# Patient Record
Sex: FEMALE | Race: BLACK OR AFRICAN AMERICAN | URBAN - METROPOLITAN AREA
[De-identification: names, ages, dates, MRNs, and addresses within clinical notes are randomized per-mention and may not be internally consistent; named-entity substitution may affect disease eponyms.]

---

## 2019-09-19 ENCOUNTER — OFFICE VISIT (OUTPATIENT)
Dept: FAMILY MEDICINE CLINIC | Age: 64
End: 2019-09-19

## 2019-09-19 VITALS
WEIGHT: 171 LBS | HEIGHT: 63 IN | RESPIRATION RATE: 13 BRPM | OXYGEN SATURATION: 99 % | DIASTOLIC BLOOD PRESSURE: 82 MMHG | TEMPERATURE: 97.8 F | HEART RATE: 62 BPM | SYSTOLIC BLOOD PRESSURE: 126 MMHG | BODY MASS INDEX: 30.3 KG/M2

## 2019-09-19 DIAGNOSIS — B35.3 TINEA PEDIS OF BOTH FEET: Primary | ICD-10-CM

## 2019-09-19 DIAGNOSIS — Z13.6 ENCOUNTER FOR LIPID SCREENING FOR CARDIOVASCULAR DISEASE: ICD-10-CM

## 2019-09-19 DIAGNOSIS — E66.3 OVERWEIGHT (BMI 25.0-29.9): ICD-10-CM

## 2019-09-19 DIAGNOSIS — Z13.220 ENCOUNTER FOR LIPID SCREENING FOR CARDIOVASCULAR DISEASE: ICD-10-CM

## 2019-09-19 DIAGNOSIS — M54.32 SCIATICA, LEFT SIDE: ICD-10-CM

## 2019-09-19 RX ORDER — KETOCONAZOLE 20 MG/G
CREAM TOPICAL
Qty: 30 G | Refills: 2 | Status: SHIPPED | OUTPATIENT
Start: 2019-09-19 | End: 2020-09-30 | Stop reason: ALTCHOICE

## 2019-09-19 NOTE — PROGRESS NOTES
Cora Cheatham is a 75 y.o. female (: 1955) presenting to address:    Chief Complaint   Patient presents with   [facility].     declined flu vaccine    Nail Problem     left thumb nail is coming off, removed from skin, very itchy; bilat foot fungus in simental of feet between toes       Vitals:    19 1519   Weight: 171 lb (77.6 kg)   Height: 5' 3.43\" (1.611 m)   PainSc:   0 - No pain       Hearing/Vision:   No exam data present    Learning Assessment:     Learning Assessment 2019   PRIMARY LEARNER Patient   HIGHEST LEVEL OF EDUCATION - PRIMARY LEARNER  4 YEARS OF COLLEGE   BARRIERS PRIMARY LEARNER NONE   CO-LEARNER CAREGIVER No   PRIMARY LANGUAGE ENGLISH    NEED No   LEARNER PREFERENCE PRIMARY DEMONSTRATION   ANSWERED BY patient   RELATIONSHIP SELF     Depression Screening:     3 most recent PHQ Screens 2019   Little interest or pleasure in doing things Not at all   Feeling down, depressed, irritable, or hopeless Not at all   Total Score PHQ 2 0     Fall Risk Assessment:     Fall Risk Assessment, last 12 mths 2019   Able to walk? Yes   Fall in past 12 months? No     Abuse Screening:     Abuse Screening Questionnaire 2019   Do you ever feel afraid of your partner? N   Are you in a relationship with someone who physically or mentally threatens you? N   Is it safe for you to go home? Y     Coordination of Care Questionaire:   1. Have you been to the ER, urgent care clinic since your last visit? Hospitalized since your last visit? NO    2. Have you seen or consulted any other health care providers outside of the [address] [place] [address] since your last visit? Include any pap smears or colon screening. YES, neurology    Advanced Directive:   1. Do you have an Advanced Directive? NO    2. Would you like information on Advanced Directives? YES    Patient DECLINED flu vaccine.

## 2019-09-19 NOTE — PATIENT INSTRUCTIONS
Return for fasting lab, further disposition pending lab results if indicated Sign release for past medical records Ketoconazole cream to be applied to the affected areas between the toes twice daily until symptoms resolve Avoid dietary starch and sugar and follow a program of regular aerobic exercise Follow exercise instructions for sciatica Schedule Medicare wellness evaluation follow-up in 6 months Learning About Low-Carbohydrate Diets for Weight Loss What is a low-carbohydrate diet? Low-carb diets avoid foods that are high in carbohydrate. These high-carb foods include pasta, bread, rice, cereal, fruits, and starchy vegetables. Instead, these diets usually have you eat foods that are high in fat and protein. Many people lose weight quickly on a low-carb diet. But the early weight loss is water. People on this diet often gain the weight back after they start eating carbs again. Not all diet plans are safe or work well. A lot of the evidence shows that low-carb diets aren't healthy. That's because these diets often don't include healthy foods like fruits and vegetables. Losing weight safely means balancing protein, fat, and carbs with every meal and snack. And low-carb diets don't always provide the vitamins, minerals, and fiber you need. If you have a serious medical condition, talk to your doctor before you try any diet. These conditions include kidney disease, heart disease, type 2 diabetes, high cholesterol, and high blood pressure. If you are pregnant, it may not be safe for your baby if you are on a low-carb diet. How can you lose weight safely? You might have heard that a diet plan helped another person lose weight. But that doesn't mean that it will work for you. It is very hard to stay on a diet that includes lots of big changes in your eating habits. If you want to get to a healthy weight and stay there, making healthy lifestyle changes will often work better than dieting. These steps can help. · Make a plan for change. Work with your doctor to create a plan that is right for you. · See a dietitian. He or she can show you how to make healthy changes in your eating habits. · Manage stress. If you have a lot of stress in your life, it can be hard to focus on making healthy changes to your daily habits. · Track your food and activity. You are likely to do better at losing weight if you keep track of what you eat and what you do. Follow-up care is a key part of your treatment and safety. Be sure to make and go to all appointments, and call your doctor if you are having problems. It's also a good idea to know your test results and keep a list of the medicines you take. Where can you learn more? Go to http://margarita-frank.info/. Enter A121 in the search box to learn more about \"Learning About Low-Carbohydrate Diets for Weight Loss. \" Current as of: November 7, 2018 Content Version: 12.1 © 1763-9284 [employer]. Care instructions adapted under license by [employer] (which disclaims liability or warranty for this information). If you have questions about a medical condition or this instruction, always ask your healthcare professional. [address] any warranty or liability for your use of this information. Athlete's Foot: Care Instructions Your Care Instructions Athlete's foot is an itchy rash on the foot caused by an infection with a fungus. You can get it by going barefoot in wet public areas, such as swimming pools or locker rooms. Many times there is no clear reason why you get athlete's foot. You can easily treat athlete's foot by putting medicine on your feet for 1 to 6 weeks. In some cases, a doctor may prescribe pills to kill the fungus. Follow-up care is a key part of your treatment and safety.  Be sure to make and go to all appointments, and call your doctor if you are having problems. It's also a good idea to know your test results and keep a list of the medicines you take. How can you care for yourself at home? · Your doctor may suggest an over-the counter lotion or spray or may prescribe a medicine. Take your medicines exactly as prescribed. Call your doctor if you think you are having a problem with your medicine. · Keep your feet clean and dry. · When you get dressed, put your socks on before your underwear. This can prevent the fungus from spreading from your feet to your groin. To prevent athlete's foot · Wear flip-flops or other shower sandals in public locker rooms and showers and by the pool. · Dry between your toes after swimming or bathing. · Wear leather shoes or sandals, which let air get to your feet. · Change your socks as needed so your feet stay as dry as possible. · Use antifungal powder on your feet. When should you call for help? Watch closely for changes in your health, and be sure to contact your doctor if: 
  · You do not get better as expected. Where can you learn more? Go to http://margarita-frank.info/. Enter M498 in the search box to learn more about \"Athlete's Foot: Care Instructions. \" Current as of: April 1, 2019 Content Version: 12.1 © 7780-4296 [employer]. Care instructions adapted under license by [employer] (which disclaims liability or warranty for this information). If you have questions about a medical condition or this instruction, always ask your healthcare professional. [address] any warranty or liability for your use of this information. Sciatica: Exercises Introduction Here are some examples of typical rehabilitation exercises for your condition. Start each exercise slowly. Ease off the exercise if you start to have pain. Your doctor or physical therapist will tell you when you can start these exercises and which ones will work best for you. When you are not being active, find a comfortable position for rest. Some people are comfortable on the floor or a medium-firm bed with a small pillow under their head and another under their knees. Some people prefer to lie on their side with a pillow between their knees. Don't stay in one position for too long. Take short walks (10 to 20 minutes) every 2 to 3 hours. Avoid slopes, hills, and stairs until you feel better. Walk only distances you can manage without pain, especially leg pain. How to do the exercises Back stretches 1. Get down on your hands and knees on the floor. 2. Relax your head and allow it to droop. Round your back up toward the ceiling until you feel a nice stretch in your upper, middle, and lower back. Hold this stretch for as long as it feels comfortable, or about 15 to 30 seconds. 3. Return to the starting position with a flat back while you are on your hands and knees. 4. Let your back sway by pressing your stomach toward the floor. Lift your buttocks toward the ceiling. 5. Hold this position for 15 to 30 seconds. 6. Repeat 2 to 4 times. Follow-up care is a key part of your treatment and safety. Be sure to make and go to all appointments, and call your doctor if you are having problems. It's also a good idea to know your test results and keep a list of the medicines you take. Where can you learn more? Go to http://margarita-frank.info/. Enter N135 in the search box to learn more about \"Sciatica: Exercises. \" Current as of: September 20, 2018 Content Version: 12.1 © 4578-2648 [employer]. Care instructions adapted under license by [employer] (which disclaims liability or warranty for this information). If you have questions about a medical condition or this instruction, always ask your healthcare professional. [address] any warranty or liability for your use of this information.

## 2019-09-19 NOTE — PROGRESS NOTES
HISTORY OF PRESENT ILLNESS  Kyleigh Nash is a 75 y.o. female. Presents to establish care, for health assessment, physical exam and preventative health care evaluation. Establish Care   The history is provided by the patient and medical records. Pertinent negatives include no chest pain, no abdominal pain, no headaches and no shortness of breath. Mr#: <[id number]>      Past Medical History:   Diagnosis Date    Arthritis     Chronic pain        Past Surgical History:   Procedure Laterality Date    HX GYN      3 c-sections       Family History   Problem Relation Age of Onset    Glaucoma Mother     Heart defect Mother        No Known Allergies    Social History     Tobacco Use   Smoking Status Former Smoker   Smokeless Tobacco Never Used       Social History     Substance and Sexual Activity   Alcohol Use Not Currently       Health Maintenance Review:  Colonoscopy - unknown  Mammogram - 18 months  Pap Smear - NL, N/A  DEXA scan -? Will schedule  Glaucoma check - < 2 years ago    Immunizations:?  Tetanus -  Influenza - Declnes  PCV - 13 -  PPSV - 23 -  HZV -    Medicare wellness evaluation -patient does not recall any previous Medicare wellness evaluation       There is no problem list on file for this patient. No current outpatient medications on file. Review of Systems   Constitutional: Negative for chills, fever and weight loss. HENT: Negative for congestion, hearing loss and sore throat. Eyes: Negative for blurred vision and double vision. Corrective lenses   Respiratory: Negative for cough, shortness of breath and wheezing. Cardiovascular: Negative for chest pain, palpitations and leg swelling. Gastrointestinal: Negative for abdominal pain, blood in stool, constipation, diarrhea, heartburn, melena, nausea and vomiting. Genitourinary: Negative for dysuria, flank pain, frequency, hematuria and urgency.         Post void dribbling   Musculoskeletal: Positive for back pain (Sciatica left ~ 3 years worse in the morning). Negative for joint pain and myalgias. Skin: Negative for itching and rash. Left thumbnail itches, \"ready to come off\"     \"Athlete's foot\", both feet, persistent -patient reports that ketoconazole cream was previously ineffective and caused hyperpigmentation of her toes. She has been applying OTC Tinactin cream without improvement       Neurological: Negative for dizziness, tingling, sensory change, focal weakness and headaches. Endo/Heme/Allergies: Negative for environmental allergies. Psychiatric/Behavioral: Negative for depression. The patient is not nervous/anxious and does not have insomnia. Visit Vitals  /82 (BP 1 Location: Left arm, BP Patient Position: Sitting)   Pulse 62   Temp 97.8 °F (36.6 °C) (Oral)   Resp 13   Ht 5' 3.43\" (1.611 m)   Wt 171 lb (77.6 kg)   SpO2 99%   BMI 29.89 kg/m²       Physical Exam   Constitutional: She is oriented to person, place, and time. She appears well-developed and well-nourished. HENT:   Head: Normocephalic. Right Ear: Tympanic membrane and ear canal normal.   Left Ear: Tympanic membrane and ear canal normal.   Mouth/Throat: Oropharynx is clear and moist.   Eyes: Pupils are equal, round, and reactive to light. Conjunctivae and EOM are normal.   Neck: Neck supple. Cardiovascular: Normal rate, regular rhythm, normal heart sounds and intact distal pulses. Pulmonary/Chest: Effort normal and breath sounds normal.   Abdominal: Soft. Bowel sounds are normal. She exhibits no mass. There is no tenderness. Musculoskeletal: She exhibits no edema. Back exam reveals left lumbar tenderness to palpation, negative straight leg raise and LETI bilaterally, LE muscle strength grossly intact and symmetrical   Neurological: She is alert and oriented to person, place, and time. She has normal reflexes. Skin: Skin is warm and dry. Rash noted.    Extreme in the webspaces between the second and third and third and fourth toes of both feet  Left thumbnail covered with heavy acrylic nail polish but with no obvious nail thickening attachment to the nailbed is loose   Psychiatric: She has a normal mood and affect. Her behavior is normal.   Nursing note and vitals reviewed. ASSESSMENT and PLAN    ICD-10-CM ICD-9-CM    1. Tinea pedis of both feet B35.3 110.4 CBC WITH AUTOMATED DIFF      ketoconazole (NIZORAL) 2 % topical cream      REFERRAL TO PODIATRY   2. Encounter for lipid screening for cardiovascular disease Z13.220 V77.91 CBC WITH AUTOMATED DIFF    Z13.6 V81.2 LIPID PANEL      METABOLIC PANEL, BASIC   3. Sciatica, left side M54.32 724.3 CBC WITH AUTOMATED DIFF      REFERRAL TO PHYSICAL THERAPY   4. Overweight (BMI 25.0-29. 9) E66.3 278.02    Return for fasting lab, further disposition pending lab results if indicated  Sign release for past medical records  Podiatry referral for chronic problems with tinea pedis  Avoid dietary starch and sugar and follow a program of regular aerobic exercise  Follow exercise instructions for sciatica  Schedule Medicare wellness evaluation follow-up in 6 months

## 2019-10-02 ENCOUNTER — HOSPITAL ENCOUNTER (OUTPATIENT)
Dept: PHYSICAL THERAPY | Age: 64
Discharge: HOME | End: 2019-10-02
Payer: MEDICARE

## 2019-10-02 PROCEDURE — 97162 PT EVAL MOD COMPLEX 30 MIN: CPT

## 2019-10-02 PROCEDURE — 97110 THERAPEUTIC EXERCISES: CPT

## 2019-10-02 NOTE — PROGRESS NOTES
[address] PHYSICAL THERAPY  [address] [place] [address] [address], [address],[place], [facility] - Phone: [phone]  Fax: [phone] / 3127 [facility]  Patient Name: Sony Thomas : 1955   Medical   Diagnosis: L Side Sciatica Treatment Diagnosis: Left hip pain [M25.552]  Sciatica [M54.30]   Onset Date: Chronic, Increase past year     Referral Source: Maryam Hernandez MD [facility]): 10/2/2019   Prior Hospitalization: See medical history Provider #: [id number]   Prior Level of Function: Complete prolonged standing and transfers with increased efficiency and without L LE radicular pain   Comorbidities: N/A   Medications: Verified on Patient Summary List   The Plan of Care and following information is based on the information from the initial evaluation.   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 Patient had PT Rx 1.5 years ago, but only attended a few sessions with limited benefit. Pt reports pain increased this past year without specific cause. X-rays of l/s (-) for Fx. Pt had steroid injection with improvement in pain/sxs for 1 month and then pain returned. Pain levels range 4-10/10, described as sharp. Pain occurs in l/s and radiates from L glute to post thigh. Pain levels increase with climbing into bed, movement, bending, prolonged standing and transfers; decreases with exercise and stretching. Pt reports L calf weakness occurred 1x when trying to stand up from her bed. Pt denies LE numbness/tingling, groin pain, falls or red flags.      Pt demonstrates poor body mechanics with transfers, decreased karli with amb, decreased l/s AROM (flex to ankles, ext WFL, R lat flex decreased 25%, L lat flex WFL, B Rot decreased 25%), discomfort with CHARO and DKTC, (-) Trendelenberg, Slump and SLR, (+) B 90/90 SLR, L ant rotated innominate, decreased core strength/stability, decreased B hip strength (3+/5 B hip ABD, 3/5 B hip ext), 4/5 R HS strength, 4-/5 L HS strength, 4+/5 B quad strength, decreased B hip ER AROM (L: 38 IR, 18 ER; R: 36 IR, 22 ER), B LE sensation intact and symmetrical to light touch, and decreased functional mobility. FOTO Score: 61/100  ===========================================================================================  Eval Complexity: History MEDIUM  Complexity : 1-2 comorbidities / personal factors will impact the outcome/ POC ;  Examination  HIGH Complexity : 4+ Standardized tests and measures addressing body structure, function, activity limitation and / or participation in recreation ; Presentation MEDIUM Complexity : Evolving with changing characteristics ; Decision Making MEDIUM Complexity : FOTO score of 26-74; Overall Complexity MEDIUM  Problem List: pain affecting function, decrease ROM, decrease strength, impaired gait/ balance, decrease ADL/ functional abilitiies, decrease activity tolerance, decrease flexibility/ joint mobility and decrease transfer abilities   Treatment Plan may include any combination of the following: Therapeutic exercise, Therapeutic activities, Neuromuscular re-education, Physical agent/modality, Gait/balance training, Manual therapy, Aquatic therapy, Patient education, Self Care training, Functional mobility training, Home safety training and Stair training  Patient / Family readiness to learn indicated by: asking questions, trying to perform skills and interest  Persons(s) to be included in education: patient (P)  Barriers to Learning/Limitations: None  Measures taken, if barriers to learning:    Patient Goal (s): \"No pain\"   Patient self reported health status: excellent  Rehabilitation Potential: good   Short Term Goals:  To be accomplished in  2  weeks:  1. Pt to demonstrate independence with HEP to improve core/glute/hip strength for transfers and standing activities. 2. Pt to demonstrate correct body mechanics with supine to sit and sit to stand transfers to protect l/s and improve safety and efficiency of transfers at home.  Long Term Goals: To be accomplished in  4-6  weeks:  1. Pt to report +5 or > GROC to improve functional mobility for transfers and standing activities. 2. Pt to demonstrate 2 grade or > improvement in B hip ABD/Ext strength to improve stability with prolonged standing activities. 3. Pt to report 50% or > decrease in max pain levels to improve functional mobility for transfers and standing activities. Frequency / Duration:   Patient to be seen  2  times per week for 4-6  weeks:  Patient / Caregiver education and instruction: self care, activity modification and exercises  Therapist Signature: Lilia Mercado PT Date: 82/5/2990   Certification Period: 10/2/19 to 12/27/19 Time: 12:29 PM   ===========================================================================================  I certify that the above Physical Therapy Services are being furnished while the patient is under my care. I agree with the treatment plan and certify that this therapy is necessary. Physician Signature:        Date:       Time:     Please sign and return to [facility], [facility]. or you may fax the signed copy to [phone]. Thank you.

## 2019-10-02 NOTE — PROGRESS NOTES
PHYSICAL THERAPY - DAILY TREATMENT NOTE    Patient Name: Arthur Median        Date: 10/2/2019  : 1955   yes Patient  Verified  Visit #:     Insurance: Payor: Allyson July / Plan: [place] PART A & B / Product Type: Medicare /      In time: 11:44 Out time: 12:29   Total Treatment Time: 45     Medicare/BCBS Time Tracking (below)   Total Timed Codes (min):  15 1:1 Treatment Time:  45     TREATMENT AREA =  Left hip pain [M25.552]  Sciatica [M54.30]    SUBJECTIVE  Pain Level (on 0 to 10 scale):  4  / 10   Medication Changes/New allergies or changes in medical history, any new surgeries or procedures?    no  If yes, update Summary List   Subjective Functional Status/Changes:  []  No changes reported     See initial eval          OBJECTIVE    15 min Therapeutic Exercise:  [x]  See flow sheet   Rationale:      increase strength to improve the patients ability to complete transfers and prolonged standing activities. NB min Manual Therapy: MET to correct L ant rotated innominate in supine. Rationale:      decrease pain and improve alignment to improve patient's ability to complete transfers and prolonged standing activities. Billed With/As:   [x] TE   [] TA   [] Neuro   [] Self Care Patient Education: [x] Review HEP    [] Progressed/Changed HEP based on:   [] positioning   [] body mechanics   [] transfers   [] heat/ice application    [x] other: Pt instructed on and given HEP to be completed daily. Pt educated on proper body mechanics with transfers. Pt instructed to avoid activities/positions that cause peripheralization of pain/sxs. Pt educated on red flags and to seek immediate medical attention/911 if those occur. Reviewed POC and goals. Pt reports understanding.      Other Objective/Functional Measures:    See initial eval     Post Treatment Pain Level (on 0 to 10) scale:   5  / 10     ASSESSMENT  Assessment/Changes in Function:     See initial eval     []  See Progress Note/Recertification   Patient will continue to benefit from skilled PT services to see initial eval   Progress toward goals / Updated goals:    Pt denied sharp pains or red flags with initial eval or therapeutic ex. See initial eval.     PLAN  [x]  Upgrade activities as tolerated yes Continue plan of care   []  Discharge due to :    [x]  Other: PT 2x/week for 4-6 weeks. Therapist: Deborah Hilton PT    Date: 10/2/2019 Time: 12:29 PM     No future appointments.

## 2019-10-07 ENCOUNTER — HOSPITAL ENCOUNTER (OUTPATIENT)
Dept: PHYSICAL THERAPY | Age: 64
Discharge: HOME | End: 2019-10-07
Payer: MEDICARE

## 2019-10-07 PROCEDURE — 97110 THERAPEUTIC EXERCISES: CPT

## 2019-10-07 PROCEDURE — 97140 MANUAL THERAPY 1/> REGIONS: CPT

## 2019-10-07 NOTE — PROGRESS NOTES
PHYSICAL THERAPY - DAILY TREATMENT NOTE    Patient Name: Cintia Sam        Date: 10/7/2019  : 1955   yes Patient  Verified  Visit #:     Insurance: Payor: Latoya Vega / Plan: [place] MED[place] & B / Product Type: Medicare /      In time: 3:00 Out time: 4:01   Total Treatment Time: 61     Medicare/BCBS Time Tracking (below)   Total Timed Codes (min):  51 1:1 Treatment Time:  51     TREATMENT AREA =  Left hip pain [M25.552]    SUBJECTIVE  Pain Level (on 0 to 10 scale):  5  / 10   Medication Changes/New allergies or changes in medical history, any new surgeries or procedures?    no  If yes, update Summary List   Subjective Functional Status/Changes:  []  No changes reported     Pt reports compliance with HEP. Pt denies falls or red flags. Pt reports increased pain levels this morning without specific cause. OBJECTIVE  Modalities Rationale:     decrease inflammation and decrease pain to improve patient's ability to complete transfers and prolonged standing activities. min [] Estim, type/location:                                      []  att     []  unatt     []  w/US     []  w/ice    []  w/heat    min []  Mechanical Traction: type/lbs                   []  pro   []  sup   []  int   []  cont    []  before manual    []  after manual    min []  Ultrasound, settings/location:      min []  Iontophoresis w/ dexamethasone, location:                                               []  take home patch       []  in clinic   10 min [x]  Ice     []  Heat    location/position: Supine to L glute    min []  Vasopneumatic Device, press/temp:     min []  Other:    [x] Skin assessment post-treatment (if applicable):    [x]  intact  no adverse reaction     []redness  adverse reaction:        41 min Therapeutic Exercise:  [x]  See flow sheet   Rationale:      increase strength and increase proprioception to improve the patients ability to complete transfers and prolonged standing activities.      10 min Manual Therapy: Even innominates. STM to L glute max/med and piriformis in prone. Rationale:      decrease pain and decrease trigger points to improve patient's ability to complete transfers and prolonged standing activities. Billed With/As:   [x] TE   [] TA   [] Neuro   [] Self Care Patient Education: [x] Review HEP    [] Progressed/Changed HEP based on:   [] positioning   [] body mechanics   [] transfers   [] heat/ice application    [x] other: Pt instructed to use ice prn 10-15 minutes on/ 1 hour off. Pt reports understanding. Other Objective/Functional Measures:    Progressed therapeutic ex to improve core/glute/LE strength/stability     Post Treatment Pain Level (on 0 to 10) scale:   2  / 10     ASSESSMENT  Assessment/Changes in Function:     Pt denied sharp pains or red flags with therapeutic ex. Pt reported an improvement in pain/sxs at end of session. []  See Progress Note/Recertification   Patient will continue to benefit from skilled PT services to modify and progress therapeutic interventions, address functional mobility deficits, address strength deficits, analyze and address soft tissue restrictions, analyze and cue movement patterns and analyze and modify body mechanics/ergonomics to attain remaining goals. Progress toward goals / Updated goals:    Pt reports compliance with HEP - progressing towards STG #1.       PLAN  [x]  Upgrade activities as tolerated yes Continue plan of care   []  Discharge due to :    []  Other:      Therapist: Ulisses Mesa PT    Date: 10/7/2019 Time: 3:00 PM     Future Appointments   Date Time Provider Dinora Aparicio   10/7/2019  3:00 PM Esa Ochoa [facility]   10/10/2019  4:30 PM Yamil Marc [facility]   10/14/2019  3:00 PM José Ray, PT [facility]   10/16/2019 10:30 AM José Ray, PT [facility]   10/21/2019 10:30 AM José Ray, PT [facility]   10/23/2019  3:00 PM José Ray PT [facility]

## 2019-10-11 ENCOUNTER — HOSPITAL ENCOUNTER (OUTPATIENT)
Dept: LAB | Age: 64
Discharge: HOME | End: 2019-10-11
Payer: MEDICARE

## 2019-10-11 ENCOUNTER — HOSPITAL ENCOUNTER (OUTPATIENT)
Dept: PHYSICAL THERAPY | Age: 64
Discharge: HOME | End: 2019-10-11
Payer: MEDICARE

## 2019-10-11 DIAGNOSIS — Z13.220 ENCOUNTER FOR LIPID SCREENING FOR CARDIOVASCULAR DISEASE: ICD-10-CM

## 2019-10-11 DIAGNOSIS — Z13.6 ENCOUNTER FOR LIPID SCREENING FOR CARDIOVASCULAR DISEASE: ICD-10-CM

## 2019-10-11 DIAGNOSIS — M54.32 SCIATICA, LEFT SIDE: ICD-10-CM

## 2019-10-11 DIAGNOSIS — B35.3 TINEA PEDIS OF BOTH FEET: ICD-10-CM

## 2019-10-11 LAB
ANION GAP SERPL CALC-SCNC: 4 MMOL/L (ref 3–18)
BASOPHILS # BLD: 0 K/UL (ref 0–0.1)
BASOPHILS NFR BLD: 1 % (ref 0–2)
BUN SERPL-MCNC: 13 MG/DL (ref 7–18)
BUN/CREAT SERPL: 15 (ref 12–20)
CALCIUM SERPL-MCNC: 8.9 MG/DL (ref 8.5–10.1)
CHLORIDE SERPL-SCNC: 106 MMOL/L (ref 100–111)
CHOLEST SERPL-MCNC: 266 MG/DL
CO2 SERPL-SCNC: 32 MMOL/L (ref 21–32)
CREAT SERPL-MCNC: 0.88 MG/DL (ref 0.6–1.3)
DIFFERENTIAL METHOD BLD: ABNORMAL
EOSINOPHIL # BLD: 0.1 K/UL (ref 0–0.4)
EOSINOPHIL NFR BLD: 1 % (ref 0–5)
ERYTHROCYTE [DISTWIDTH] IN BLOOD BY AUTOMATED COUNT: 14.2 % (ref 11.6–14.5)
GLUCOSE SERPL-MCNC: 80 MG/DL (ref 74–99)
HCT VFR BLD AUTO: 44.2 % (ref 35–45)
HDLC SERPL-MCNC: 74 MG/DL (ref 40–60)
HDLC SERPL: 3.6 {RATIO} (ref 0–5)
HGB BLD-MCNC: 14.1 G/DL (ref 12–16)
LDLC SERPL CALC-MCNC: 173.4 MG/DL (ref 0–100)
LIPID PROFILE,FLP: ABNORMAL
LYMPHOCYTES # BLD: 1.3 K/UL (ref 0.9–3.6)
LYMPHOCYTES NFR BLD: 29 % (ref 21–52)
MCH RBC QN AUTO: 30.2 PG (ref 24–34)
MCHC RBC AUTO-ENTMCNC: 31.9 G/DL (ref 31–37)
MCV RBC AUTO: 94.6 FL (ref 74–97)
MONOCYTES # BLD: 0.3 K/UL (ref 0.05–1.2)
MONOCYTES NFR BLD: 7 % (ref 3–10)
NEUTS SEG # BLD: 2.7 K/UL (ref 1.8–8)
NEUTS SEG NFR BLD: 62 % (ref 40–73)
PLATELET # BLD AUTO: 254 K/UL (ref 135–420)
PMV BLD AUTO: 11.8 FL (ref 9.2–11.8)
POTASSIUM SERPL-SCNC: 3.9 MMOL/L (ref 3.5–5.5)
RBC # BLD AUTO: 4.67 M/UL (ref 4.2–5.3)
SODIUM SERPL-SCNC: 142 MMOL/L (ref 136–145)
TRIGL SERPL-MCNC: 93 MG/DL (ref ?–150)
VLDLC SERPL CALC-MCNC: 18.6 MG/DL
WBC # BLD AUTO: 4.4 K/UL (ref 4.6–13.2)

## 2019-10-11 PROCEDURE — 97140 MANUAL THERAPY 1/> REGIONS: CPT

## 2019-10-11 PROCEDURE — 85025 COMPLETE CBC W/AUTO DIFF WBC: CPT

## 2019-10-11 PROCEDURE — 80048 BASIC METABOLIC PNL TOTAL CA: CPT

## 2019-10-11 PROCEDURE — 36415 COLL VENOUS BLD VENIPUNCTURE: CPT

## 2019-10-11 PROCEDURE — 97110 THERAPEUTIC EXERCISES: CPT

## 2019-10-11 PROCEDURE — 80061 LIPID PANEL: CPT

## 2019-10-13 PROBLEM — E78.00 HYPERCHOLESTEROLEMIA: Status: ACTIVE | Noted: 2019-10-13

## 2019-10-13 NOTE — PROGRESS NOTES
Please advise that lab results show elevated cholesterol levels but they are not sufficiently elevated so asked require treatment with medication, nonetheless they should be addressed with lifestyle changes which include avoiding dietary starch and sugar and following a program of regular aerobic exercise.

## 2019-10-14 ENCOUNTER — HOSPITAL ENCOUNTER (OUTPATIENT)
Dept: PHYSICAL THERAPY | Age: 64
Discharge: HOME | End: 2019-10-14
Payer: MEDICARE

## 2019-10-14 PROCEDURE — 97110 THERAPEUTIC EXERCISES: CPT

## 2019-10-14 PROCEDURE — 97140 MANUAL THERAPY 1/> REGIONS: CPT

## 2019-10-14 NOTE — PROGRESS NOTES
PHYSICAL THERAPY - DAILY TREATMENT NOTE    Patient Name: Scotty Sawant        Date: 10/14/2019  : 1955   yes Patient  Verified  Visit #:     Insurance: Payor: Josefina Royal / Plan: [place] MEDICARE PART A & B / Product Type: Medicare /      In time: 3:10 Out time: 4:01   Total Treatment Time: 51     Medicare/BCBS Time Tracking (below)   Total Timed Codes (min):  51 1:1 Treatment Time:  51     TREATMENT AREA =  Left hip pain [M25.552]    SUBJECTIVE  Pain Level (on 0 to 10 scale):  8 / 10   Medication Changes/New allergies or changes in medical history, any new surgeries or procedures?    no  If yes, update Summary List   Subjective Functional Status/Changes:  []  No changes reported     Pt reports increased pain levels on  after shopping. Pt denies falls or red flags. Pt reports compliance with HEP. Pt late to PT session. OBJECTIVE  Modalities Rationale:     N/A to improve patient's ability to complete N/A - pt declined CP   min [] Estim, type/location:                                      []  att     []  unatt     []  w/US     []  w/ice    []  w/heat    min []  Mechanical Traction: type/lbs                   []  pro   []  sup   []  int   []  cont    []  before manual    []  after manual    min []  Ultrasound, settings/location:      min []  Iontophoresis w/ dexamethasone, location:                                               []  take home patch       []  in clinic    min [x]  Ice     []  Heat    location/position:     min []  Vasopneumatic Device, press/temp:     min []  Other:    [] Skin assessment post-treatment (if applicable):    []  intact  no adverse reaction     []redness  adverse reaction:        39 min Therapeutic Exercise:  [x]  See flow sheet   Rationale:      increase strength and increase proprioception to improve the patients ability to complete transfers and prolonged standing activities. 12 min Manual Therapy: MET to correct L post rotated innominate.  STM to L glute max/med and piriformis in prone. Rationale:      decrease pain and decrease trigger points to improve patient's ability to complete transfers and prolonged standing activities. Billed With/As:   [x] TE   [] TA   [] Neuro   [] Self Care Patient Education: [x] Review HEP    [] Progressed/Changed HEP based on:   [] positioning   [] body mechanics   [] transfers   [] heat/ice application    [x] other: Pt instructed to use ice prn 10-15 minutes on/ 1 hour off. Reviewed proper body mechanics with transfers. Pt reports understanding. Other Objective/Functional Measures:    Pt requires TCs and VCs to complete standing hip ext with correct mechanics    Increased resistance to improve glute strength/stability     Post Treatment Pain Level (on 0 to 10) scale:   0  / 10     ASSESSMENT  Assessment/Changes in Function:     Pt denied sharp pains or red flags with therapeutic ex. Pt denied pain at end of session. []  See Progress Note/Recertification   Patient will continue to benefit from skilled PT services to modify and progress therapeutic interventions, address functional mobility deficits, address strength deficits, analyze and address soft tissue restrictions, analyze and cue movement patterns and analyze and modify body mechanics/ergonomics to attain remaining goals. Progress toward goals / Updated goals:    Pt demonstrates improved mechanics with supine to sit transfers today - progressing towards STG #2.       PLAN  [x]  Upgrade activities as tolerated yes Continue plan of care   []  Discharge due to :    []  Other:      Therapist: Dawood Pineda PT    Date: 10/14/2019 Time: 3:37 PM     Future Appointments   Date Time Provider Dinora Aparicio   10/16/2019 10:30 AM Yanet Torres PT [facility]   10/21/2019 12:30 PM Leon Hu PTA [facility]   10/23/2019  3:00 PM Leon Hu PTA [facility]

## 2019-10-16 ENCOUNTER — HOSPITAL ENCOUNTER (OUTPATIENT)
Dept: PHYSICAL THERAPY | Age: 64
Discharge: HOME | End: 2019-10-16
Payer: MEDICARE

## 2019-10-16 PROCEDURE — 97140 MANUAL THERAPY 1/> REGIONS: CPT

## 2019-10-16 PROCEDURE — 97110 THERAPEUTIC EXERCISES: CPT

## 2019-10-16 NOTE — PROGRESS NOTES
PHYSICAL THERAPY - DAILY TREATMENT NOTE    Patient Name: Diane Melvin        Date: 10/16/2019  : 1955   yes Patient  Verified  Visit #:     Insurance: Payor: Paty Mcleod / Plan: [place] MEDICARE PART A & B / Product Type: Medicare /      In time: 10:30 Out time: 11:31   Total Treatment Time: 61     Medicare/BCBS Time Tracking (below)   Total Timed Codes (min):  51 1:1 Treatment Time:  30     TREATMENT AREA =  Left hip pain [M25.552]    SUBJECTIVE  Pain Level (on 0 to 10 scale):  8 / 10   Medication Changes/New allergies or changes in medical history, any new surgeries or procedures?    no  If yes, update Summary List   Subjective Functional Status/Changes:  []  No changes reported     Pt reports pain in L glute area today. Pt denies falls or red flags. Pt reports partial compliance with HEP. OBJECTIVE  Modalities Rationale:     decrease inflammation and decrease pain to improve patient's ability to complete transfers and prolonged standing activities. min [] Estim, type/location:                                      []  att     []  unatt     []  w/US     []  w/ice    []  w/heat    min []  Mechanical Traction: type/lbs                   []  pro   []  sup   []  int   []  cont    []  before manual    []  after manual    min []  Ultrasound, settings/location:      min []  Iontophoresis w/ dexamethasone, location:                                               []  take home patch       []  in clinic   10 min [x]  Ice     []  Heat    location/position: Supine to L glute and l/s    min []  Vasopneumatic Device, press/temp:     min []  Other:    [x] Skin assessment post-treatment (if applicable):    [x]  intact  no adverse reaction     []redness  adverse reaction:        39 min Therapeutic Exercise:  [x]  See flow sheet (Billed 18 minutes)   Rationale:      increase strength and increase proprioception to improve the patients ability to complete transfers and prolonged standing activities.      12 min Manual Therapy: MET to correct L post rotated innominate. STM to L glute max/med and piriformis in prone. (Billed 12 minutes)   Rationale:      decrease pain and decrease trigger points to improve patient's ability to complete transfers and prolonged standing activities. Billed With/As:   [x] TE   [] TA   [] Neuro   [] Self Care Patient Education: [x] Review HEP    [] Progressed/Changed HEP based on:   [] positioning   [] body mechanics   [] transfers   [] heat/ice application    [x] other: Pt instructed to use ice prn 10-15 minutes on/ 1 hour off. Reviewed proper body mechanics with transfers. Pt reports understanding. Other Objective/Functional Measures:    TTP L glute max/med and piriformis today    Pt requires multiple TCs to improve mechanics with standing therapeutic ex     Post Treatment Pain Level (on 0 to 10) scale:   0  / 10     ASSESSMENT  Assessment/Changes in Function:     Pt denied sharp pains or red flags with therapeutic ex. Pt denied pain at end of session. []  See Progress Note/Recertification   Patient will continue to benefit from skilled PT services to modify and progress therapeutic interventions, address functional mobility deficits, address strength deficits, analyze and address soft tissue restrictions, analyze and cue movement patterns and analyze and modify body mechanics/ergonomics to attain remaining goals. Progress toward goals / Updated goals:    Partial compliance with HEP - progressing towards STG #1. Min cues with mechanics for supine and prone to sit transfers - progressing towards STG #2. PLAN  [x]  Upgrade activities as tolerated yes Continue plan of care   []  Discharge due to :    [x]  Other: Plan to re-assess FOTO next session.      Therapist: Jass Muro PT    Date: 10/16/2019 Time: 11:28 AM     Future Appointments   Date Time Provider Dinora Aparicio   10/21/2019 12:30 PM Jamilah Cross [facility]   10/23/2019  3:00 PM Audie Sherman PTA [facility]

## 2019-10-21 ENCOUNTER — HOSPITAL ENCOUNTER (OUTPATIENT)
Dept: PHYSICAL THERAPY | Age: 64
Discharge: HOME | End: 2019-10-21
Payer: MEDICARE

## 2019-10-21 PROCEDURE — 97110 THERAPEUTIC EXERCISES: CPT

## 2019-10-21 PROCEDURE — 97140 MANUAL THERAPY 1/> REGIONS: CPT

## 2019-10-21 NOTE — PROGRESS NOTES
PHYSICAL THERAPY - DAILY TREATMENT NOTE    Patient Name: Dex Nick        Date: 10/21/2019  : 1955   yes Patient  Verified  Visit #:     Insurance: Payor: Carolina Boss / Plan: [place] MEDICARE PART A & B / Product Type: Medicare /      In time: 6301 Out time: 150   Total Treatment Time: 65     Medicare/BCBS Time Tracking (below)   Total Timed Codes (min):  55 1:1 Treatment Time:  40     TREATMENT AREA =  Left hip pain [M25.552]    SUBJECTIVE  Pain Level (on 0 to 10 scale):  7  / 10   Medication Changes/New allergies or changes in medical history, any new surgeries or procedures?    no  If yes, update Summary List   Subjective Functional Status/Changes:  []  No changes reported     Pt reporting pain in the (L) glute. OBJECTIVE  Modalities Rationale:     decrease inflammation and decrease pain to improve patient's ability to perform pain free ADLs. min [] Estim, type/location:                                      []  att     []  unatt     []  w/US     []  w/ice    []  w/heat    min []  Mechanical Traction: type/lbs                   []  pro   []  sup   []  int   []  cont    []  before manual    []  after manual    min []  Ultrasound, settings/location:      min []  Iontophoresis w/ dexamethasone, location:                                               []  take home patch       []  in clinic   10 min [x]  Ice     []  Heat    location/position: Supine, (L) glute and l/s.      min []  Vasopneumatic Device, press/temp:     min []  Other:    [x] Skin assessment post-treatment (if applicable):    [x]  intact    []  redness- no adverse reaction     []redness  adverse reaction:        40 (25) min Therapeutic Exercise:  [x]  See flow sheet   Rationale:      increase ROM, increase strength and improve coordination to improve the patients ability to perform pain free ADLs. 15 min Manual Therapy: TPR (L) glute/piriformis.     Rationale:      decrease pain, increase ROM, increase tissue extensibility and decrease trigger points to improve patient's ability to perform pain free ADLs. Billed With/As:   [x] TE   [] TA   [] Neuro   [] Self Care Patient Education: [x] Review HEP    [] Progressed/Changed HEP based on:   [] positioning   [] body mechanics   [] transfers   [] heat/ice application    [] other:      Other Objective/Functional Measures:    FOTO = 45  GROC = 0     Post Treatment Pain Level (on 0 to 10) scale:   7 / 10     ASSESSMENT  Assessment/Changes in Function:     Pt continues to demonstrate (L) posteriorly rotated innominate. Partial compliance with HEP demonstrated. []  See Progress Note/Recertification   Patient will continue to benefit from skilled PT services to modify and progress therapeutic interventions, address functional mobility deficits, address ROM deficits, address strength deficits, analyze and address soft tissue restrictions, analyze and cue movement patterns, analyze and modify body mechanics/ergonomics and assess and modify postural abnormalities to attain remaining goals. Progress toward goals / Updated goals:    No change in progress per GROC score. Decrease in functional ability per FOTO.       PLAN  [x]  Upgrade activities as tolerated yes Continue plan of care   []  Discharge due to :    []  Other:      Therapist: Aren Bowles PTA    Date: 10/21/2019 Time: 12:50 PM     Future Appointments   Date Time Provider Dinora Aparicio   10/23/2019  3:00 PM [address]

## 2019-10-23 ENCOUNTER — APPOINTMENT (OUTPATIENT)
Dept: PHYSICAL THERAPY | Age: 64
End: 2019-10-23
Payer: MEDICARE

## 2019-10-28 NOTE — PROGRESS NOTES
HISTORY OF PRESENT ILLNESS  Duong Tai is a 75 y.o. female. 57-year-old female reports painless anal bleeding with blood in the commode for approximately 2 weeks. She denies any change in bowel habits. Rectal Bleeding   The history is provided by the patient and medical records. This is a new problem. Episode onset: About 2 weeks ago. Pertinent negatives include no chest pain, no abdominal pain and no shortness of breath. Mr#: [id number]      Patient Active Problem List   Diagnosis Code    Hypercholesterolemia E78.00         Current Outpatient Medications:     ketoconazole (NIZORAL) 2 % topical cream, Apply to affected area twice daily until symptoms resolve, Disp: 30 g, Rfl: 2     No Known Allergies    Review of Systems   Constitutional: Negative for fever and weight loss. HENT: Negative for congestion. Eyes: Negative for blurred vision, pain and redness. Itchy watery eyes   Respiratory: Negative for shortness of breath. Cardiovascular: Negative for chest pain and palpitations. Gastrointestinal: Positive for anal bleeding and blood in stool (with BM x 2 weeks). Negative for abdominal pain, constipation, diarrhea, heartburn, melena, nausea and vomiting. Genitourinary: Negative for dysuria, frequency, hematuria and urgency. Sometimes with difficulty emptying the bladder completely   Skin: Positive for itching ( Facial). Neurological: Negative for dizziness. Visit Vitals  /78 (BP 1 Location: Left arm, BP Patient Position: Sitting)   Pulse 62   Temp 97.9 °F (36.6 °C) (Oral)   Resp 12   Ht 5' 3.43\" (1.611 m)   Wt 173 lb 12.8 oz (78.8 kg)   SpO2 99%   BMI 30.37 kg/m²       Physical Exam   Constitutional: She is oriented to person, place, and time. She appears well-developed and well-nourished. Weight stable   HENT:   Head: Normocephalic. Eyes: Conjunctivae and EOM are normal. Right eye exhibits no discharge. Left eye exhibits no discharge. Neck: Neck supple. Cardiovascular: Normal rate, regular rhythm and normal heart sounds. Pulmonary/Chest: Effort normal and breath sounds normal.   Abdominal: Soft. Bowel sounds are normal. There is no tenderness. Musculoskeletal: She exhibits no edema. Neurological: She is alert and oriented to person, place, and time. Skin: Skin is warm and dry. Psychiatric: She has a normal mood and affect. Her behavior is normal.   Nursing note and vitals reviewed. ASSESSMENT and PLAN    ICD-10-CM ICD-9-CM    1. Blood in stool K92.1 578.1 REFERRAL TO GASTROENTEROLOGY   2.  Allergic conjunctivitis of both eyes H10.13 372.14 ketotifen (ZADITOR) 0.025 % (0.035 %) ophthalmic solution      cetirizine (ZYRTEC) 10 mg tablet   Gastroenterology referral  Zaditor eyedrops 1 or 2 drops to each eye twice daily as needed for itchy watery eyes  Zyrtec 10 mg daily as needed for facial itching, itchy watery eyes  Follow-up for new symptoms worsening symptoms or failure to improve

## 2019-10-29 ENCOUNTER — OFFICE VISIT (OUTPATIENT)
Dept: FAMILY MEDICINE CLINIC | Age: 64
End: 2019-10-29

## 2019-10-29 VITALS
TEMPERATURE: 97.9 F | HEART RATE: 62 BPM | RESPIRATION RATE: 12 BRPM | WEIGHT: 173.8 LBS | BODY MASS INDEX: 30.79 KG/M2 | HEIGHT: 63 IN | DIASTOLIC BLOOD PRESSURE: 78 MMHG | SYSTOLIC BLOOD PRESSURE: 122 MMHG | OXYGEN SATURATION: 99 %

## 2019-10-29 DIAGNOSIS — H10.13 ALLERGIC CONJUNCTIVITIS OF BOTH EYES: ICD-10-CM

## 2019-10-29 DIAGNOSIS — K92.1 BLOOD IN STOOL: Primary | ICD-10-CM

## 2019-10-29 RX ORDER — CETIRIZINE HCL 10 MG
TABLET ORAL
Qty: 30 TAB | Refills: 3 | Status: SHIPPED | OUTPATIENT
Start: 2019-10-29 | End: 2020-09-30

## 2019-10-29 RX ORDER — KETOTIFEN FUMARATE 0.35 MG/ML
SOLUTION/ DROPS OPHTHALMIC
Qty: 10 ML | Refills: 3 | Status: SHIPPED | OUTPATIENT
Start: 2019-10-29 | End: 2020-09-30

## 2019-10-29 NOTE — PATIENT INSTRUCTIONS
Gastroenterology referral 
Zaditor eyedrops 1 or 2 drops to each eye twice daily as needed for itchy watery eyes Zyrtec 10 mg daily as needed for facial itching, itchy watery eyes Follow-up for new symptoms worsening symptoms or failure to improve Lower Gastrointestinal Bleeding: Care Instructions Your Care Instructions The digestive or gastrointestinal tract goes from the mouth to the anus. It is often called the GI tract. Bleeding in the lower GI tract can happen anywhere in your small or large intestine. It can also happen in your rectum or anus. In some cases, it is caused by an infection, cancer, or inflammatory bowel disease. Or it may be caused by hemorrhoids, diverticulitis, or clotting problems. Light bleeding may not cause any symptoms at first. But if you continue to bleed for a while, you may feel very weak or tired. Sudden, heavy bleeding means you need to see a doctor right away. This kind of bleeding can be very dangerous. But it can usually be cured or controlled. The doctor may do some tests to find the cause of your bleeding. Follow-up care is a key part of your treatment and safety. Be sure to make and go to all appointments, and call your doctor if you are having problems. It's also a good idea to know your test results and keep a list of the medicines you take. How can you care for yourself at home? · Be safe with medicines. Take your medicines exactly as prescribed. Call your doctor if you think you are having a problem with your medicine. You will get more details on the specific medicines your doctor prescribes. · Do not take aspirin or other anti-inflammatory medicines, such as naproxen (Aleve) or ibuprofen (Advil, Motrin), without talking to your doctor first. Ask your doctor if it is okay to use acetaminophen (Tylenol). · Do not drink alcohol. · The bleeding may make you lose iron.  So it's important to eat foods that have a lot of iron. These include red meat, shellfish, poultry, and eggs. They also include beans, raisins, whole-grain breads, and leafy green vegetables. If you want help planning meals, you can meet with a dietitian. When should you call for help? Call 911 anytime you think you may need emergency care. For example, call if: 
  · You have sudden, severe belly pain.  
  · You vomit blood or what looks like coffee grounds.  
  · You passed out (lost consciousness).  
  · Your stools are maroon or very bloody.  
 Call your doctor now or seek immediate medical care if: 
  · You are dizzy or lightheaded, or you feel like you may faint.  
  · Your stools are black and look like tar, or they have streaks of blood.  
  · You have belly pain.  
  · You vomit or have nausea.  
 Watch closely for changes in your health, and be sure to contact your doctor if you do not get better as expected. Where can you learn more? Go to http://margarita-frank.info/. Enter I662 in the search box to learn more about \"Lower Gastrointestinal Bleeding: Care Instructions. \" Current as of: June 26, 2019 Content Version: 12.2 © 4084-5050 [employer]. Care instructions adapted under license by [employer] (which disclaims liability or warranty for this information). If you have questions about a medical condition or this instruction, always ask your healthcare professional. [place] [address] any warranty or liability for your use of this information. Seasonal Allergies: Care Instructions Your Care Instructions Allergies occur when your body's defense system (immune system) overreacts to certain substances. The immune system treats a harmless substance as if it were a harmful germ or virus. Many things can cause this to happen. Examples include pollens, medicine, food, dust, animal dander, and mold.  
Your allergies are seasonal if you have symptoms just at certain times of the year. In that case, you are probably allergic to pollens from certain trees, grasses, or weeds. Allergies can be mild or severe. Over-the-counter allergy medicine may help with some symptoms. Read and follow all instructions on the label. Managing your allergies is an important part of staying healthy. Your doctor may suggest that you have tests to help find the cause of your allergies. When you know what things trigger your symptoms, you can avoid them. This can prevent allergy symptoms and other health problems. In some cases, immunotherapy might help. For this treatment, you get shots or use pills that have a small amount of certain allergens in them. Your body \"gets used to\" the allergen, so you react less to it over time. This kind of treatment may help prevent or reduce some allergy symptoms. Follow-up care is a key part of your treatment and safety. Be sure to make and go to all appointments, and call your doctor if you are having problems. It's also a good idea to know your test results and keep a list of the medicines you take. How can you care for yourself at home? · Be safe with medicines. Take your medicines exactly as prescribed. Call your doctor if you think you are having a problem with your medicine. · During your allergy season, keep windows closed. If you need to use air-conditioning, change or clean all filters every month. Take a shower and change your clothes after you have been outside. · Stay inside when pollen counts are high. Vacuum once or twice a week. Use a vacuum  with a HEPA filter or a double-thickness filter. When should you call for help? Give an epinephrine shot if: 
  · You think you are having a severe allergic reaction.  
 After giving an epinephrine shot, call 911, even if you feel better. 
 Call 911 if: 
  · You have symptoms of a severe allergic reaction. These may include: 
? Sudden raised, red areas (hives) all over your body. ? Swelling of the throat, mouth, lips, or tongue. ? Trouble breathing. ? Passing out (losing consciousness). Or you may feel very lightheaded or suddenly feel weak, confused, or restless.  
  · You have been given an epinephrine shot, even if you feel better.  
 Call your doctor now or seek immediate medical care if: 
  · You have symptoms of an allergic reaction, such as: ? A rash or hives (raised, red areas on the skin). ? Itching. ? Swelling. ? Belly pain, nausea, or vomiting.  
 Watch closely for changes in your health, and be sure to contact your doctor if: 
  · You do not get better as expected. Where can you learn more? Go to http://margarita-frank.info/. Enter J912 in the search box to learn more about \"Seasonal Allergies: Care Instructions. \" Current as of: April 7, 2019 Content Version: 12.2 © 1479-5485 [employer]. Care instructions adapted under license by [employer] (which disclaims liability or warranty for this information). If you have questions about a medical condition or this instruction, always ask your healthcare professional. [place] [address] any warranty or liability for your use of this information.

## 2019-10-29 NOTE — PROGRESS NOTES
Alli Burgess is a 75 y.o. female (: 1955) presenting to address:    Chief Complaint   Patient presents with    Melena     declined flu vaccine       Vitals:    10/29/19 0708   BP: 122/78   Pulse: 62   Resp: 12   Temp: 97.9 °F (36.6 °C)   TempSrc: Oral   SpO2: 99%   Weight: 173 lb 12.8 oz (78.8 kg)   Height: 5' 3.43\" (1.611 m)   PainSc:   0 - No pain       Hearing/Vision:   No exam data present    Learning Assessment:     Learning Assessment 2019   PRIMARY LEARNER Patient   HIGHEST LEVEL OF EDUCATION - PRIMARY LEARNER  4 YEARS OF COLLEGE   BARRIERS PRIMARY LEARNER NONE   CO-LEARNER CAREGIVER No   PRIMARY LANGUAGE ENGLISH    NEED No   LEARNER PREFERENCE PRIMARY DEMONSTRATION   ANSWERED BY patient   RELATIONSHIP SELF     Depression Screening:     3 most recent PHQ Screens 10/29/2019   Little interest or pleasure in doing things Not at all   Feeling down, depressed, irritable, or hopeless Not at all   Total Score PHQ 2 0     Fall Risk Assessment:     Fall Risk Assessment, last 12 mths 10/29/2019   Able to walk? Yes   Fall in past 12 months? No     Abuse Screening:     Abuse Screening Questionnaire 2019   Do you ever feel afraid of your partner? N   Are you in a relationship with someone who physically or mentally threatens you? N   Is it safe for you to go home? Y     Coordination of Care Questionaire:   1. Have you been to the ER, urgent care clinic since your last visit? Hospitalized since your last visit? NO    2. Have you seen or consulted any other health care providers outside of the [address] [place] [address] since your last visit? Include any pap smears or colon screening. YES, dermatology    Advanced Directive:   1. Do you have an Advanced Directive? NO    2. Would you like information on Advanced Directives? NO    Patient DECLINED flu vaccine.

## 2020-03-16 NOTE — PROGRESS NOTES
[address] PHYSICAL THERAPY  [address] [place] [address], [address],Carole Chairez, [address] - Phone: [phone]  Fax: [phone]  DISCHARGE SUMMARY FOR PHYSICAL THERAPY          Patient Name: Karena Platt : 1955   Treatment/Medical Diagnosis: Left hip pain [M25.552], L side sciatica   Onset Date: Chronic, Increase past year    Referral Source: Cj Ugalde MD [facility]): 10/2/2019   Prior Hospitalization: See Medical History Provider #: [id number]   Prior Level of Function: Complete prolonged standing and transfers with increased efficiency and without L LE radicular pain   Comorbidities: N/A   Medications: Verified on Patient Summary List   Visits from [facility]: 6 Missed Visits: 3     Goal/Measure of Progress Goal Met? 1.  Pt to report +5 or > on GROC to improve functional mobility for transfers and standing activities. Status at last Eval: N/A Current Status: 0 no   2. Pt to demonstrate 2 grade or > improvement in B hip ABD/ext strength to improve stability with prolonged standing activities. Status at last Eval: 3+/5 B hip ABD, 3/5 B hip ext Current Status: Not re-assessed due to pt self DC no   3. Pt to report 50% or > decrease in max pain levels to improve functional mobility for transfers and standing activities. Status at last Eval: 10/10 Current Status: 7/10 pain levels at last PT session no     Key Functional Changes/Progress: Pt's progress limited with PT due to partial compliance with HEP and attending only 5 follow up sessions. Pt did not return to PT after visit on 10/21/19. No formal re-assessment of pt due to pt self DC. Assessments/Recommendations: Patient discharged from PT due to not returning after PT session on 10/21/19. Thank you for this referral.  If you have any questions/comments please contact us directly at [phone]. Thank you for allowing us to assist in the care of your patient.     Therapist Signature: Zurdo Ramos PT Date: 3/16/2020     Reporting Period:   10/2/19 to 10/21/19 Time: 4:08 PM

## 2020-09-30 ENCOUNTER — OFFICE VISIT (OUTPATIENT)
Dept: FAMILY MEDICINE CLINIC | Age: 65
End: 2020-09-30
Payer: MEDICARE

## 2020-09-30 ENCOUNTER — APPOINTMENT (OUTPATIENT)
Dept: FAMILY MEDICINE CLINIC | Age: 65
End: 2020-09-30

## 2020-09-30 ENCOUNTER — HOSPITAL ENCOUNTER (OUTPATIENT)
Dept: LAB | Age: 65
Discharge: HOME | End: 2020-09-30
Payer: MEDICARE

## 2020-09-30 VITALS
DIASTOLIC BLOOD PRESSURE: 80 MMHG | SYSTOLIC BLOOD PRESSURE: 140 MMHG | HEIGHT: 63 IN | TEMPERATURE: 97.5 F | WEIGHT: 184.8 LBS | BODY MASS INDEX: 32.74 KG/M2 | OXYGEN SATURATION: 99 % | HEART RATE: 74 BPM | RESPIRATION RATE: 15 BRPM

## 2020-09-30 DIAGNOSIS — Z12.31 BREAST CANCER SCREENING BY MAMMOGRAM: ICD-10-CM

## 2020-09-30 DIAGNOSIS — Z11.59 NEED FOR HEPATITIS C SCREENING TEST: ICD-10-CM

## 2020-09-30 DIAGNOSIS — Z23 NEEDS FLU SHOT: ICD-10-CM

## 2020-09-30 DIAGNOSIS — M65.331 TRIGGER MIDDLE FINGER OF RIGHT HAND: ICD-10-CM

## 2020-09-30 DIAGNOSIS — Z13.220 ENCOUNTER FOR LIPID SCREENING FOR CARDIOVASCULAR DISEASE: ICD-10-CM

## 2020-09-30 DIAGNOSIS — B35.3 TINEA PEDIS OF BOTH FEET: ICD-10-CM

## 2020-09-30 DIAGNOSIS — Z00.00 MEDICARE ANNUAL WELLNESS VISIT, INITIAL: Primary | ICD-10-CM

## 2020-09-30 DIAGNOSIS — E78.00 HYPERCHOLESTEROLEMIA: ICD-10-CM

## 2020-09-30 DIAGNOSIS — Z23 ENCOUNTER FOR IMMUNIZATION: ICD-10-CM

## 2020-09-30 DIAGNOSIS — Z13.6 ENCOUNTER FOR LIPID SCREENING FOR CARDIOVASCULAR DISEASE: ICD-10-CM

## 2020-09-30 LAB
ALBUMIN SERPL-MCNC: 3.5 G/DL (ref 3.4–5)
ALBUMIN/GLOB SERPL: 0.9 {RATIO} (ref 0.8–1.7)
ALP SERPL-CCNC: 111 U/L (ref 45–117)
ALT SERPL-CCNC: 22 U/L (ref 13–56)
ANION GAP SERPL CALC-SCNC: 5 MMOL/L (ref 3–18)
APPEARANCE UR: CLEAR
AST SERPL-CCNC: 17 U/L (ref 10–38)
BASOPHILS # BLD: 0 K/UL (ref 0–0.1)
BASOPHILS NFR BLD: 1 % (ref 0–2)
BILIRUB SERPL-MCNC: 0.6 MG/DL (ref 0.2–1)
BILIRUB UR QL: NEGATIVE
BUN SERPL-MCNC: 12 MG/DL (ref 7–18)
BUN/CREAT SERPL: 13 (ref 12–20)
CALCIUM SERPL-MCNC: 8.7 MG/DL (ref 8.5–10.1)
CHLORIDE SERPL-SCNC: 108 MMOL/L (ref 100–111)
CHOLEST SERPL-MCNC: 222 MG/DL
CO2 SERPL-SCNC: 31 MMOL/L (ref 21–32)
COLOR UR: YELLOW
CREAT SERPL-MCNC: 0.94 MG/DL (ref 0.6–1.3)
DIFFERENTIAL METHOD BLD: ABNORMAL
EOSINOPHIL # BLD: 0.1 K/UL (ref 0–0.4)
EOSINOPHIL NFR BLD: 2 % (ref 0–5)
ERYTHROCYTE [DISTWIDTH] IN BLOOD BY AUTOMATED COUNT: 14.2 % (ref 11.6–14.5)
GLOBULIN SER CALC-MCNC: 3.8 G/DL (ref 2–4)
GLUCOSE SERPL-MCNC: 95 MG/DL (ref 74–99)
GLUCOSE UR STRIP.AUTO-MCNC: NEGATIVE MG/DL
HCT VFR BLD AUTO: 39.9 % (ref 35–45)
HDLC SERPL-MCNC: 75 MG/DL (ref 40–60)
HDLC SERPL: 3 {RATIO} (ref 0–5)
HGB BLD-MCNC: 12.8 G/DL (ref 12–16)
HGB UR QL STRIP: NEGATIVE
KETONES UR QL STRIP.AUTO: NEGATIVE MG/DL
LDLC SERPL CALC-MCNC: 126 MG/DL (ref 0–100)
LEUKOCYTE ESTERASE UR QL STRIP.AUTO: NEGATIVE
LIPID PROFILE,FLP: ABNORMAL
LYMPHOCYTES # BLD: 1.6 K/UL (ref 0.9–3.6)
LYMPHOCYTES NFR BLD: 31 % (ref 21–52)
MCH RBC QN AUTO: 30.5 PG (ref 24–34)
MCHC RBC AUTO-ENTMCNC: 32.1 G/DL (ref 31–37)
MCV RBC AUTO: 95.2 FL (ref 74–97)
MONOCYTES # BLD: 0.5 K/UL (ref 0.05–1.2)
MONOCYTES NFR BLD: 9 % (ref 3–10)
NEUTS SEG # BLD: 2.9 K/UL (ref 1.8–8)
NEUTS SEG NFR BLD: 57 % (ref 40–73)
NITRITE UR QL STRIP.AUTO: NEGATIVE
PH UR STRIP: 6.5 [PH] (ref 5–8)
PLATELET # BLD AUTO: 263 K/UL (ref 135–420)
PMV BLD AUTO: 11.4 FL (ref 9.2–11.8)
POTASSIUM SERPL-SCNC: 3.6 MMOL/L (ref 3.5–5.5)
PROT SERPL-MCNC: 7.3 G/DL (ref 6.4–8.2)
PROT UR STRIP-MCNC: NEGATIVE MG/DL
RBC # BLD AUTO: 4.19 M/UL (ref 4.2–5.3)
SODIUM SERPL-SCNC: 144 MMOL/L (ref 136–145)
SP GR UR REFRACTOMETRY: 1.02 (ref 1–1.03)
TRIGL SERPL-MCNC: 105 MG/DL (ref ?–150)
TSH SERPL DL<=0.05 MIU/L-ACNC: 1.98 UIU/ML (ref 0.36–3.74)
UROBILINOGEN UR QL STRIP.AUTO: 0.2 EU/DL (ref 0.2–1)
VLDLC SERPL CALC-MCNC: 21 MG/DL
WBC # BLD AUTO: 5.1 K/UL (ref 4.6–13.2)

## 2020-09-30 PROCEDURE — 90471 IMMUNIZATION ADMIN: CPT

## 2020-09-30 PROCEDURE — 3017F COLORECTAL CA SCREEN DOC REV: CPT | Performed by: FAMILY MEDICINE

## 2020-09-30 PROCEDURE — G8417 CALC BMI ABV UP PARAM F/U: HCPCS | Performed by: FAMILY MEDICINE

## 2020-09-30 PROCEDURE — 80053 COMPREHEN METABOLIC PANEL: CPT

## 2020-09-30 PROCEDURE — G0438 PPPS, INITIAL VISIT: HCPCS | Performed by: FAMILY MEDICINE

## 2020-09-30 PROCEDURE — 1101F PT FALLS ASSESS-DOCD LE1/YR: CPT | Performed by: FAMILY MEDICINE

## 2020-09-30 PROCEDURE — G8536 NO DOC ELDER MAL SCRN: HCPCS | Performed by: FAMILY MEDICINE

## 2020-09-30 PROCEDURE — 84443 ASSAY THYROID STIM HORMONE: CPT

## 2020-09-30 PROCEDURE — 80061 LIPID PANEL: CPT

## 2020-09-30 PROCEDURE — G8400 PT W/DXA NO RESULTS DOC: HCPCS | Performed by: FAMILY MEDICINE

## 2020-09-30 PROCEDURE — G8510 SCR DEP NEG, NO PLAN REQD: HCPCS | Performed by: FAMILY MEDICINE

## 2020-09-30 PROCEDURE — 36415 COLL VENOUS BLD VENIPUNCTURE: CPT

## 2020-09-30 PROCEDURE — 81003 URINALYSIS AUTO W/O SCOPE: CPT

## 2020-09-30 PROCEDURE — G8427 DOCREV CUR MEDS BY ELIG CLIN: HCPCS | Performed by: FAMILY MEDICINE

## 2020-09-30 PROCEDURE — 1090F PRES/ABSN URINE INCON ASSESS: CPT | Performed by: FAMILY MEDICINE

## 2020-09-30 PROCEDURE — 86803 HEPATITIS C AB TEST: CPT

## 2020-09-30 PROCEDURE — 90732 PPSV23 VACC 2 YRS+ SUBQ/IM: CPT

## 2020-09-30 PROCEDURE — 85025 COMPLETE CBC W/AUTO DIFF WBC: CPT

## 2020-09-30 PROCEDURE — G9899 SCRN MAM PERF RSLTS DOC: HCPCS | Performed by: FAMILY MEDICINE

## 2020-09-30 PROCEDURE — G0463 HOSPITAL OUTPT CLINIC VISIT: HCPCS | Performed by: FAMILY MEDICINE

## 2020-09-30 PROCEDURE — 99213 OFFICE O/P EST LOW 20 MIN: CPT | Performed by: FAMILY MEDICINE

## 2020-09-30 RX ORDER — TERBINAFINE HYDROCHLORIDE 250 MG/1
TABLET ORAL
Qty: 30 TAB | Refills: 0 | Status: SHIPPED | OUTPATIENT
Start: 2020-09-30 | End: 2021-02-12 | Stop reason: ALTCHOICE

## 2020-09-30 NOTE — PATIENT INSTRUCTIONS
5-year personalized preventative services plan: 
Complete and return advance directives form Consider Shingrix Pneumococcal immunization due now given today Influenza immunization due now given today Breast cancer screening-mammogram scheduled Colon cancer screening-3 years ago in [place]? Osteoporosis screening-done in [place]? Medicare wellness evaluation and follow-up September 2021 Lab today, further disposition pending lab results Begin terbinafine 250 mg daily for 30 days Orthopedic surgery referral regarding trigger finger right hand Avoid dietary salt, starch and sugar and as much as possible follow program of regular aerobic exercise

## 2020-09-30 NOTE — PROGRESS NOTES
Fatima Bamberger is a 77 y.o. female (: 1955) presenting to address:    Chief Complaint   Patient presents with    Annual Wellness Visit       Vitals:    20 1019 20 1022   BP: (!) 140/78 (!) 140/80   Pulse: 74    Resp: 15    Temp: 97.5 °F (36.4 °C)    TempSrc: Temporal    SpO2: 99%    Weight: 184 lb 12.8 oz (83.8 kg)    Height: 5' 3.43\" (1.611 m)    PainSc:   0 - No pain        Hearing/Vision:   No exam data present    Learning Assessment:     Learning Assessment 2019   PRIMARY LEARNER Patient   HIGHEST LEVEL OF EDUCATION - PRIMARY LEARNER  4 YEARS OF COLLEGE   BARRIERS PRIMARY LEARNER NONE   CO-LEARNER CAREGIVER No   PRIMARY LANGUAGE ENGLISH    NEED No   LEARNER PREFERENCE PRIMARY DEMONSTRATION   ANSWERED BY patient   RELATIONSHIP SELF     Depression Screening:     3 most recent PHQ Screens 2020   Little interest or pleasure in doing things Not at all   Feeling down, depressed, irritable, or hopeless Not at all   Total Score PHQ 2 0     Fall Risk Assessment:     Fall Risk Assessment, last 12 mths 2020   Able to walk? Yes   Fall in past 12 months? No     Abuse Screening:     Abuse Screening Questionnaire 2019   Do you ever feel afraid of your partner? N   Are you in a relationship with someone who physically or mentally threatens you? N   Is it safe for you to go home? Y     Coordination of Care Questionaire:   1. Have you been to the ER, urgent care clinic since your last visit? Hospitalized since your last visit? NO    2. Have you seen or consulted any other health care providers outside of the [address] [place] [address] since your last visit? Include any pap smears or colon screening. NO    Advanced Directive:   1. Do you have an Advanced Directive? NO    2. Would you like information on Advanced Directives? YES      Immunization/s of the Flu vaccine and Pneumovax 23 were administered 2020 by Sunny Garcia LPN. Patient tolerated procedure well.   No reactions noted.

## 2020-09-30 NOTE — PROGRESS NOTES
HISTORY OF PRESENT ILLNESS  Colin Khan is a 77 y.o. female. She presents with concerns about toenail fungus, with a history of hyperlipidemia and for Medicare wellness evaluation    Mr#: [id number]      Patient Active Problem List   Diagnosis Code    Hypercholesterolemia E78.00         Current Outpatient Medications:     ketotifen (ZADITOR) 0.025 % (0.035 %) ophthalmic solution, 1 or 2 drops to each eye twice daily as needed for itching, watery eyes, Disp: 10 mL, Rfl: 3    cetirizine (ZYRTEC) 10 mg tablet, 1 tablet daily as needed for allergy symptoms, facial itching, Disp: 30 Tab, Rfl: 3    ketoconazole (NIZORAL) 2 % topical cream, Apply to affected area twice daily until symptoms resolve, Disp: 30 g, Rfl: 2     No Known Allergies    Review of Systems   Constitutional: Negative for chills, fever and weight loss. HENT: Negative for congestion, hearing loss and sore throat. Eyes: Negative for blurred vision and double vision. Respiratory: Negative for cough, shortness of breath and wheezing. Cardiovascular: Negative for chest pain, palpitations and leg swelling. Gastrointestinal: Negative for abdominal pain, blood in stool, constipation, diarrhea, heartburn, melena, nausea and vomiting. Genitourinary: Negative for dysuria and urgency. Musculoskeletal: Positive for joint pain ( Reports triggering, PIP joint right long finger). Negative for myalgias. Skin: Negative for itching and rash. Most bothered by persistent itching between the toes previously failed treatment with ketoconazole cream and was referred for podiatry evaluation at which time oral medication was recommended but declined by the patient who was continued on ketoconazole cream.  She was asked to return there for follow-up in 3 months but has not done so. She now reports that she is ready to take the oral medication. Neurological: Negative for dizziness, tingling, sensory change, focal weakness and headaches. Endo/Heme/Allergies: Negative for environmental allergies. Psychiatric/Behavioral: Negative for depression. The patient is not nervous/anxious and does not have insomnia. Visit Vitals  BP (!) 140/80 (BP 1 Location: Left arm, BP Patient Position: Sitting)   Pulse 74   Temp 97.5 °F (36.4 °C) (Temporal)   Resp 15   Ht 5' 3.43\" (1.611 m)   Wt 184 lb 12.8 oz (83.8 kg)   SpO2 99%   BMI 32.29 kg/m²       . Physical Exam  Vitals signs and nursing note reviewed. Constitutional:       General: She is not in acute distress. Appearance: Normal appearance. She is obese. She is not ill-appearing. Comments: 13 pound weight gain in the past year   HENT:      Head: Normocephalic. Right Ear: Tympanic membrane, ear canal and external ear normal.      Left Ear: Tympanic membrane, ear canal and external ear normal.   Eyes:      Extraocular Movements: Extraocular movements intact. Conjunctiva/sclera: Conjunctivae normal.      Pupils: Pupils are equal, round, and reactive to light. Neck:      Musculoskeletal: Neck supple. Vascular: No carotid bruit. Cardiovascular:      Rate and Rhythm: Normal rate and regular rhythm. Heart sounds: Normal heart sounds. Comments: Borderline elevated blood pressure, patient attributes this to anxiety associated with having been lost this morning and unable to find this office  Pulmonary:      Effort: Pulmonary effort is normal.      Breath sounds: Normal breath sounds. Abdominal:      Palpations: Abdomen is soft. Tenderness: There is no abdominal tenderness. Musculoskeletal:         General: No deformity. Right lower leg: No edema. Left lower leg: No edema. Skin:     General: Skin is warm and dry. Comments: Severe anterior digital tissue maceration consistent with tinea pedis as previously described last year   Neurological:      General: No focal deficit present.       Mental Status: She is alert and oriented to person, place, and time.   Psychiatric:         Mood and Affect: Mood normal.         Behavior: Behavior normal.         ASSESSMENT and PLAN    ICD-10-CM ICD-9-CM    1. Medicare annual wellness visit, initial  Z00.00 V70.0    2. Hypercholesterolemia  E78.00 272.0 CBC WITH AUTOMATED DIFF      LIPID PANEL      METABOLIC PANEL, COMPREHENSIVE      URINALYSIS W/ RFLX MICROSCOPIC      TSH 3RD GENERATION   3. Tinea pedis of both feet  B35.3 110.4 terbinafine HCL (LAMISIL) 250 mg tablet   4. Needs flu shot  Z23 V04.81 FLU (FLUAD QUAD INFLUENZA VACCINE,QUAD,ADJUVANTED)   5. Breast cancer screening by mammogram  Z12.31 V76.12 BETY MAMMO BI SCREENING INCL CAD   6. Trigger middle finger of right hand  M65.331 727.03 REFERRAL TO ORTHOPEDIC SURGERY   7. Encounter for immunization  Z23 V03.89 PNEUMOCOCCAL POLYSACCHARIDE VACCINE, 23-VALENT, ADULT OR IMMUNOSUPPRESSED PT DOSE,      ADMIN PNEUMOCOCCAL VACCINE   8. Encounter for lipid screening for cardiovascular disease  Z13.220 V77.91     Z13.6 V81.2    9. Need for hepatitis C screening test  Z11.59 V73.89 HEPATITIS C AB         Lab today, further disposition pending lab results  Begin terbinafine 250 mg daily for 30 days  Orthopedic surgery referral regarding trigger finger right hand  Avoid dietary salt, starch and sugar and as much as possible follow program of regular aerobic exercise      Date of Service:  2020   Patient Name:  Lois Hagen   Patient :  1955     This is an Initial Medicare Annual Wellness Exam (AWV) (Performed 12 months after IPPE or effective date of Medicare Part B enrollment, Once in a lifetime)      I have reviewed the patient's medical history in detail and updated the computerized patient record.      History     Past Medical History:   Diagnosis Date    Arthritis     Chronic pain     Hypercholesterolemia 10/13/2019      Past Surgical History:   Procedure Laterality Date    HX GYN      3 c-sections     Current Outpatient Medications   Medication Sig Dispense Refill    ketotifen (ZADITOR) 0.025 % (0.035 %) ophthalmic solution 1 or 2 drops to each eye twice daily as needed for itching, watery eyes 10 mL 3    cetirizine (ZYRTEC) 10 mg tablet 1 tablet daily as needed for allergy symptoms, facial itching 30 Tab 3    ketoconazole (NIZORAL) 2 % topical cream Apply to affected area twice daily until symptoms resolve 30 g 2     No Known Allergies  Family History   Problem Relation Age of Onset    Glaucoma Mother     Heart defect Mother      Social History     Tobacco Use    Smoking status: Former Smoker    Smokeless tobacco: Never Used   Substance Use Topics    Alcohol use: Not Currently     Patient Active Problem List   Diagnosis Code    Hypercholesterolemia E78.00       Living situation:   -- Lives  alone  -- Stairs in home yes    Diet, Lifestyle: nonsmoker    Exercise level: moderately active      Depression Risk Factor Screening:     3 most recent PHQ Screens 10/29/2019   Little interest or pleasure in doing things Not at all   Feeling down, depressed, irritable, or hopeless Not at all   Total Score PHQ 2 0     Alcohol Risk Factor Screening:   Do you average more than 1 drink per night or more than 7 drinks a week:  No    On any one occasion in the past three months have you have had more than 3 drinks containing alcohol:  No    Functional Ability and Level of Safety:     Hearing Loss   Hearing is good. Activities of Daily Living   Self-care. Requires assistance with: no ADLs    Fall Risk     Fall Risk Assessment, last 12 mths 10/29/2019   Able to walk? Yes   Fall in past 12 months? No     Abuse Screen   Patient is not abused    Review of Systems   Review of systems reported in the separate problem-oriented documentation. Physical Examination   Physical exam reported in the separate problem-oriented documentation.     Evaluation of Cognitive Function:  Mood/affect:  happy  Appearance: age appropriate  Family member/caregiver input: no    Patient Care Team:  Pamela Shaikh MD as PCP - General (Family Medicine)  Pamela Shaikh MD as PCP - [facility] Empaneled Provider    Advice/Counseling   Education and counseling provided:  Advance directives counseling/discussion    Assessment/Plan       ICD-10-CM ICD-9-CM    1. Medicare annual wellness visit, initial  Z00.00 V70.0    2. Hypercholesterolemia  E78.00 272.0 CBC WITH AUTOMATED DIFF      LIPID PANEL      METABOLIC PANEL, COMPREHENSIVE      URINALYSIS W/ RFLX MICROSCOPIC      TSH 3RD GENERATION   3. Tinea pedis of both feet  B35.3 110.4 terbinafine HCL (LAMISIL) 250 mg tablet   4. Needs flu shot  Z23 V04.81 FLU (FLUAD QUAD INFLUENZA VACCINE,QUAD,ADJUVANTED)   5. Breast cancer screening by mammogram  Z12.31 V76.12 BETY MAMMO BI SCREENING INCL CAD   6. Trigger middle finger of right hand  M65.331 727.03 REFERRAL TO ORTHOPEDIC SURGERY   7. Encounter for immunization  Z23 V03.89 PNEUMOCOCCAL POLYSACCHARIDE VACCINE, 23-VALENT, ADULT OR IMMUNOSUPPRESSED PT DOSE,      ADMIN PNEUMOCOCCAL VACCINE   8. Encounter for lipid screening for cardiovascular disease  Z13.220 V77.91     Z13.6 V81.2    9. Need for hepatitis C screening test  Z11.59 V73.89 HEPATITIS C AB     Health maintenance:        5-year personalized preventative services plan:  Complete and return advance directives form  Consider Shingrix-declines, reports no history of childhood chickenpox  Pneumococcal immunization due now given today  Influenza immunization due now given today   Breast cancer screening-mammogram scheduled  Colon cancer screening-3 years ago in [place]? Osteoporosis screening-done in [place]?   Medicare wellness evaluation and follow-up September 2021    Please consult paperwork at home and call back with dates and results of colonoscopy and previous bone density tests done in [address] [place] [address] Str. was provided a written 5-year personalized preventive services plan, which was included in her AVS.    Yennifer Bearden MD      PLEASE NOTE:   This document has been produced using voice recognition software. Unrecognized errors in transcription may be present.

## 2020-10-01 LAB
HCV AB SER IA-ACNC: 0.29 INDEX
HCV AB SERPL QL IA: NEGATIVE
HCV COMMENT,HCGAC: NORMAL

## 2020-11-25 LAB
MAMMOGRAPHY, EXTERNAL: NORMAL
MAMMOGRAPHY, EXTERNAL: NORMAL

## 2021-02-04 ENCOUNTER — TELEPHONE (OUTPATIENT)
Dept: FAMILY MEDICINE CLINIC | Age: 66
End: 2021-02-04

## 2021-02-12 ENCOUNTER — OFFICE VISIT (OUTPATIENT)
Dept: FAMILY MEDICINE CLINIC | Age: 66
End: 2021-02-12
Payer: MEDICARE

## 2021-02-12 VITALS
HEART RATE: 75 BPM | TEMPERATURE: 97.1 F | SYSTOLIC BLOOD PRESSURE: 170 MMHG | DIASTOLIC BLOOD PRESSURE: 90 MMHG | BODY MASS INDEX: 32.89 KG/M2 | WEIGHT: 185.6 LBS | OXYGEN SATURATION: 98 % | RESPIRATION RATE: 15 BRPM | HEIGHT: 63 IN

## 2021-02-12 DIAGNOSIS — B35.1 ONYCHOMYCOSIS OF TOENAIL: ICD-10-CM

## 2021-02-12 DIAGNOSIS — B35.3 TINEA PEDIS OF BOTH FEET: ICD-10-CM

## 2021-02-12 DIAGNOSIS — B35.1 ONYCHOMYCOSIS OF NAIL OF DIGIT OF HAND: Primary | ICD-10-CM

## 2021-02-12 PROCEDURE — G0463 HOSPITAL OUTPT CLINIC VISIT: HCPCS | Performed by: FAMILY MEDICINE

## 2021-02-12 PROCEDURE — 3017F COLORECTAL CA SCREEN DOC REV: CPT | Performed by: FAMILY MEDICINE

## 2021-02-12 PROCEDURE — G8427 DOCREV CUR MEDS BY ELIG CLIN: HCPCS | Performed by: FAMILY MEDICINE

## 2021-02-12 PROCEDURE — G8417 CALC BMI ABV UP PARAM F/U: HCPCS | Performed by: FAMILY MEDICINE

## 2021-02-12 PROCEDURE — 1090F PRES/ABSN URINE INCON ASSESS: CPT | Performed by: FAMILY MEDICINE

## 2021-02-12 PROCEDURE — G9899 SCRN MAM PERF RSLTS DOC: HCPCS | Performed by: FAMILY MEDICINE

## 2021-02-12 PROCEDURE — G8510 SCR DEP NEG, NO PLAN REQD: HCPCS | Performed by: FAMILY MEDICINE

## 2021-02-12 PROCEDURE — G8400 PT W/DXA NO RESULTS DOC: HCPCS | Performed by: FAMILY MEDICINE

## 2021-02-12 PROCEDURE — 1101F PT FALLS ASSESS-DOCD LE1/YR: CPT | Performed by: FAMILY MEDICINE

## 2021-02-12 PROCEDURE — G8536 NO DOC ELDER MAL SCRN: HCPCS | Performed by: FAMILY MEDICINE

## 2021-02-12 PROCEDURE — 99213 OFFICE O/P EST LOW 20 MIN: CPT | Performed by: FAMILY MEDICINE

## 2021-02-12 NOTE — PROGRESS NOTES
HISTORY OF PRESENT ILLNESS  Diana Calderon is a 74 y.o. female. She reports concern about discoloration and itching involving the left thumbnail. She additionally has a history of toenail onychomycosis and tinea pedis not responsive to topical treatment with tinea pedis not responsive to a 30-day trial of terbinafine possibly because of noncompliance. She also requests referral for screening colonoscopy however chart review indicates that at her Medicare wellness evaluation in September she reported having had a colonoscopy 3 years ago and agreed to obtain the paperwork and provided for her records. Today she indicates that she thinks it has been longer than 3 years but that her colonoscopy was completely normal and she has no increased risk factors such as family history. She feels absolutely sure that she has had the procedure within 10 years. Mr#: [id number]      Patient Active Problem List   Diagnosis Code    Hypercholesterolemia E78.00       No current outpatient medications on file. No Known Allergies    Review of Systems   Constitutional: Negative for fever. Skin: Positive for itching. She reports being significantly bothered by itching involving the left thumbnail, the toenails of both feet and the interdigital spaces     Visit Vitals  BP (!) 170/90 (BP 1 Location: Left upper arm, BP Patient Position: Sitting, BP Cuff Size: Adult)   Pulse 75   Temp 97.1 °F (36.2 °C) (Temporal)   Resp 15   Ht 5' 3.43\" (1.611 m)   Wt 185 lb 9.6 oz (84.2 kg)   SpO2 98%   BMI 32.43 kg/m²       Physical Exam  Vitals signs and nursing note reviewed. Constitutional:       Appearance: Normal appearance. She is well-developed. HENT:      Head: Normocephalic. Neck:      Musculoskeletal: Neck supple. Cardiovascular:      Rate and Rhythm: Normal rate. Pulmonary:      Effort: Pulmonary effort is normal.   Skin:     General: Skin is warm and dry.       Comments: Multiple discolored toenails with thickening, discolored left thumbnail, toes with interdigital maceration consistent with tinea pedis   Neurological:      Mental Status: She is alert and oriented to person, place, and time. Psychiatric:         Behavior: Behavior normal.         ASSESSMENT and PLAN    ICD-10-CM ICD-9-CM    1. Onychomycosis of nail of digit of hand  B35.1 110.1 REFERRAL TO DERMATOLOGY   2. Onychomycosis of toenail  B35.1 110.1 REFERRAL TO DERMATOLOGY   3. Tinea pedis of both feet  B35.3 110.4 REFERRAL TO DERMATOLOGY   Dermatology referral  Counseled that based on the information she is providing she is not due for screening colonoscopy however she is encouraged to see if she can find out the specific date of her last colonoscopy and verification of the reported results.

## 2021-02-12 NOTE — PROGRESS NOTES
Pramod Caicedo is a 74 y.o. female (: 1955) presenting to address:    Chief Complaint   Patient presents with    Nail Problem     left thumb nail, possible fungus, chronic       Vitals:    21 1408 21 1410   BP: (!) 170/90 (!) 170/90   Pulse: 75    Resp: 15    Temp: 97.1 °F (36.2 °C)    TempSrc: Temporal    SpO2: 98%    Weight: 185 lb 9.6 oz (84.2 kg)    Height: 5' 3.43\" (1.611 m)    PainSc:   0 - No pain        Hearing/Vision:   No exam data present    Learning Assessment:     Learning Assessment 2020   PRIMARY LEARNER Patient   HIGHEST LEVEL OF EDUCATION - PRIMARY LEARNER  4 YEARS OF COLLEGE   BARRIERS PRIMARY LEARNER NONE   CO-LEARNER CAREGIVER No   PRIMARY LANGUAGE ENGLISH    NEED No   LEARNER PREFERENCE PRIMARY READING   ANSWERED BY patient   RELATIONSHIP SELF     Depression Screening:     3 most recent [facility] [address] Screens 2021   [facility] [address] Not Done Patient Decline   Little interest or pleasure in doing things Not at all   Feeling down, depressed, irritable, or hopeless Not at all   Total Score PHQ 2 0     Fall Risk Assessment:     Fall Risk Assessment, last 12 mths 2021   Able to walk? Yes   Fall in past 12 months? 0   Do you feel unsteady? 0   Are you worried about falling 0     Abuse Screening:     Abuse Screening Questionnaire 2020   Do you ever feel afraid of your partner? N   Are you in a relationship with someone who physically or mentally threatens you? N   Is it safe for you to go home? Y     Coordination of Care Questionaire:   1. Have you been to the ER, urgent care clinic since your last visit? Hospitalized since your last visit? NO    2. Have you seen or consulted any other health care providers outside of the [address] [place] [address] since your last visit? Include any pap smears or colon screening. NO    Advanced Directive:   1. Do you have an Advanced Directive? NO    2. Would you like information on Advanced Directives?  NO

## 2021-02-16 ENCOUNTER — TELEPHONE (OUTPATIENT)
Dept: FAMILY MEDICINE CLINIC | Age: 66
End: 2021-02-16

## 2021-02-16 NOTE — TELEPHONE ENCOUNTER
No medications were ordered for the patient, she was advised that the dermatology referral was for evaluation and treatment of her thumb and her feet.

## 2021-02-16 NOTE — TELEPHONE ENCOUNTER
Spoke w/pt and gave her the referral information for dermatology to contact them to schedule appt. Pt states PCP was supposed to give medication for the nails, informed pt there was not medication listed in the notes; pt asked if a medication was supposed to be written.

## 2021-02-16 NOTE — TELEPHONE ENCOUNTER
Pt hasn't received any medication yet. She states that she was supposed to give the pharmacy she wanted it to go to and she did, but there are no meds there. I have verified the pharmacy is correct. She was also asking about a referral to podiatry for her finger. She hasn't received a call about an appt either. Please advise.

## 2021-11-12 ENCOUNTER — OFFICE VISIT (OUTPATIENT)
Dept: FAMILY MEDICINE CLINIC | Age: 66
End: 2021-11-12
Payer: MEDICARE

## 2021-11-12 VITALS
HEIGHT: 64 IN | DIASTOLIC BLOOD PRESSURE: 62 MMHG | SYSTOLIC BLOOD PRESSURE: 138 MMHG | BODY MASS INDEX: 31.79 KG/M2 | WEIGHT: 186.2 LBS | HEART RATE: 62 BPM | RESPIRATION RATE: 17 BRPM | OXYGEN SATURATION: 98 % | TEMPERATURE: 97 F

## 2021-11-12 DIAGNOSIS — B35.1 PAIN DUE TO ONYCHOMYCOSIS OF TOENAILS OF BOTH FEET: Primary | ICD-10-CM

## 2021-11-12 DIAGNOSIS — Z12.31 BREAST CANCER SCREENING BY MAMMOGRAM: ICD-10-CM

## 2021-11-12 DIAGNOSIS — E66.9 OBESITY (BMI 30.0-34.9): ICD-10-CM

## 2021-11-12 DIAGNOSIS — E78.00 HYPERCHOLESTEROLEMIA: ICD-10-CM

## 2021-11-12 DIAGNOSIS — N64.89 BREAST ASYMMETRY: ICD-10-CM

## 2021-11-12 DIAGNOSIS — M79.674 PAIN DUE TO ONYCHOMYCOSIS OF TOENAILS OF BOTH FEET: Primary | ICD-10-CM

## 2021-11-12 DIAGNOSIS — M79.675 PAIN DUE TO ONYCHOMYCOSIS OF TOENAILS OF BOTH FEET: Primary | ICD-10-CM

## 2021-11-12 PROCEDURE — G8427 DOCREV CUR MEDS BY ELIG CLIN: HCPCS | Performed by: FAMILY MEDICINE

## 2021-11-12 PROCEDURE — 1101F PT FALLS ASSESS-DOCD LE1/YR: CPT | Performed by: FAMILY MEDICINE

## 2021-11-12 PROCEDURE — G8400 PT W/DXA NO RESULTS DOC: HCPCS | Performed by: FAMILY MEDICINE

## 2021-11-12 PROCEDURE — G8510 SCR DEP NEG, NO PLAN REQD: HCPCS | Performed by: FAMILY MEDICINE

## 2021-11-12 PROCEDURE — 3017F COLORECTAL CA SCREEN DOC REV: CPT | Performed by: FAMILY MEDICINE

## 2021-11-12 PROCEDURE — G8417 CALC BMI ABV UP PARAM F/U: HCPCS | Performed by: FAMILY MEDICINE

## 2021-11-12 PROCEDURE — 99213 OFFICE O/P EST LOW 20 MIN: CPT | Performed by: FAMILY MEDICINE

## 2021-11-12 PROCEDURE — G8536 NO DOC ELDER MAL SCRN: HCPCS | Performed by: FAMILY MEDICINE

## 2021-11-12 PROCEDURE — G9899 SCRN MAM PERF RSLTS DOC: HCPCS | Performed by: FAMILY MEDICINE

## 2021-11-12 PROCEDURE — 1090F PRES/ABSN URINE INCON ASSESS: CPT | Performed by: FAMILY MEDICINE

## 2021-11-12 NOTE — PROGRESS NOTES
HISTORY OF PRESENT ILLNESS  Rosemary Avitia is a 74 y.o. female. She presents reporting concern about the toe pain with a history of onychomycosis and tinea pedis as well as concern about breast asymmetry. Terbinafine tablets were previously prescribed but not completed by the patient since they caused \"itching\". Reports that she has seen a dermatology consultant who treated her fingernail onychomycosis with a topical ointment with good success. She notes that the left breast seems to hang down further than the right breast for the past few months. She denies breast pain. She has not noted any masses or tenderness on self-exam.  She denies any significant weight gain or weight loss. Mr#: [id number]      Past Medical History:   Diagnosis Date    Arthritis     Chronic pain     Hypercholesterolemia 10/13/2019       Past Surgical History:   Procedure Laterality Date    HX GYN      3 c-sections       Family History   Problem Relation Age of Onset    Glaucoma Mother     Heart defect Mother        No Known Allergies    Social History     Tobacco Use   Smoking Status Former Smoker    Packs/day: 0.15    Years: 3.00    Pack years: 0.45    Types: Cigarettes    Quit date: 2015    Years since quittin.8   Smokeless Tobacco Never Used       Social History     Substance and Sexual Activity   Alcohol Use Not Currently            Patient Active Problem List   Diagnosis Code    Hypercholesterolemia E78.00       No current outpatient medications on file. Review of Systems   Constitutional: Negative for fever and weight loss. Skin:        Fungal infection of toenails   Endo/Heme/Allergies:        Has noticed that the left breast seems larger or to hang lower than the right, first noticed a few months ago. No associated discomfort.      Visit Vitals  /62 (BP 1 Location: Left upper arm, BP Patient Position: Sitting)   Pulse 62   Temp 97 °F (36.1 °C)   Resp 17   Ht 5' 3.78\" (1.62 m)   Wt 186 lb 3.2 oz (84.5 kg)   SpO2 98%   BMI 32.18 kg/m²       Physical Exam  Vitals and nursing note reviewed. Constitutional:       General: She is not in acute distress. Appearance: Normal appearance. She is obese. She is not ill-appearing. HENT:      Head: Normocephalic. Eyes:      Extraocular Movements: Extraocular movements intact. Neck:      Vascular: No carotid bruit. Cardiovascular:      Rate and Rhythm: Normal rate and regular rhythm. Pulmonary:      Effort: Pulmonary effort is normal.   Genitourinary:     Comments: Pendulous appearing breasts, left perhaps slightly larger or lower than the right, not examined. Musculoskeletal:      Cervical back: Neck supple. Skin:     Comments: Advanced onychomycosis multiple toenails   Neurological:      Mental Status: She is alert and oriented to person, place, and time. Psychiatric:         Mood and Affect: Mood normal.         Behavior: Behavior normal.         ASSESSMENT and PLAN    ICD-10-CM ICD-9-CM    1. Pain due to onychomycosis of toenails of both feet  B35.1 110.1 REFERRAL TO DERMATOLOGY    M79.675 729.5     M79.674     2. Hypercholesterolemia  E78.00 272.0 CBC WITH AUTOMATED DIFF      METABOLIC PANEL, COMPREHENSIVE      LIPID PANEL      TSH 3RD GENERATION   3. Breast asymmetry  N64.89 611.89 BETY MAMMO BI SCREENING INCL CAD   4. Obesity (BMI 30.0-34. 9)  E66.9 278.00    5.  Breast cancer screening by mammogram  Z12.31 V76.12 BETY MAMMO BI SCREENING INCL CAD   Assessment:  Toe pain-onychomycosis  Breast asymmetry  Status of hyperlipidemia to be determined pending lab results  Obesity essentially unchanged    Plan:  Dermatology referral  Referral for mammogram  Reschedule Medicare wellness appointment after lab appointment  Lab studies ordered

## 2021-11-12 NOTE — PROGRESS NOTES
Blayne Walker is a 74 y.o. female (: 1955) presenting to address:    Chief Complaint   Patient presents with    Annual Wellness Visit       Vitals:    21 1519   BP: 138/62   Pulse: 62   Resp: 17   Temp: 97 °F (36.1 °C)   SpO2: 98%   Weight: 186 lb 3.2 oz (84.5 kg)   Height: 5' 3.78\" (1.62 m)   PainSc:   0 - No pain       Hearing/Vision:      Hearing Screening    125Hz 250Hz 500Hz 1000Hz 2000Hz 3000Hz 4000Hz 6000Hz 8000Hz   Right ear:            Left ear:               Visual Acuity Screening    Right eye Left eye Both eyes   Without correction: 20/40 20/40 20/40   With correction:          Learning Assessment:     Learning Assessment 2020   PRIMARY LEARNER Patient   HIGHEST LEVEL OF EDUCATION - PRIMARY LEARNER  4 YEARS OF COLLEGE   BARRIERS PRIMARY LEARNER NONE   CO-LEARNER CAREGIVER No   PRIMARY LANGUAGE ENGLISH    NEED No   LEARNER PREFERENCE PRIMARY READING   ANSWERED BY patient   RELATIONSHIP SELF     Depression Screening:     3 most recent PHQ Screens 2021   PHQ Not Done -   Little interest or pleasure in doing things Not at all   Feeling down, depressed, irritable, or hopeless Not at all   Total Score PHQ 2 0     Fall Risk Assessment:     Fall Risk Assessment, last 12 mths 2021   Able to walk? Yes   Fall in past 12 months? 0   Do you feel unsteady? 0   Are you worried about falling 0     Abuse Screening:     Abuse Screening Questionnaire 2021   Do you ever feel afraid of your partner? N   Are you in a relationship with someone who physically or mentally threatens you? N   Is it safe for you to go home?  Y     ADL Assessment:     ADL Assessment 2021   Feeding yourself No Help Needed   Getting from bed to chair No Help Needed   Getting dressed No Help Needed   Bathing or showering No Help Needed   Walk across the room (includes cane/walker) No Help Needed   Using the telphone No Help Needed   Taking your medications No Help Needed   Preparing meals No Help Needed   Managing money (expenses/bills) No Help Needed   Moderately strenuous housework (laundry) No Help Needed   Shopping for personal items (toiletries/medicines) No Help Needed   Shopping for groceries No Help Needed   Driving No Help Needed   Climbing a flight of stairs No Help Needed   Getting to places beyond walking distances No Help Needed        Coordination of Care Questionaire:   1. \"Have you been to the ER, urgent care clinic since your last visit? Hospitalized since your last visit? \"       No     2. \"Have you seen or consulted any other health care providers outside of the [address] [place] [address] [place] since your last visit? \"   Yes ortho injection for trigger finger, derm last year for fungus     3. For patients aged 39-70: Has the patient had a colonoscopy? No     If the patient is female:    4. For patients aged 41-77: Has the patient had a mammogram within the past 2 years? No   Advanced Directive:   1. Do you have an Advanced Directive   No   2. Would you like information on Advanced Directives?     No

## 2021-11-15 ENCOUNTER — APPOINTMENT (OUTPATIENT)
Dept: FAMILY MEDICINE CLINIC | Age: 66
End: 2021-11-15

## 2021-11-15 ENCOUNTER — HOSPITAL ENCOUNTER (OUTPATIENT)
Dept: LAB | Age: 66
Discharge: HOME | End: 2021-11-15
Payer: MEDICARE

## 2021-11-15 DIAGNOSIS — E78.00 HYPERCHOLESTEROLEMIA: ICD-10-CM

## 2021-11-15 LAB
ALBUMIN SERPL-MCNC: 3.7 G/DL (ref 3.4–5)
ALBUMIN/GLOB SERPL: 0.9 {RATIO} (ref 0.8–1.7)
ALP SERPL-CCNC: 111 U/L (ref 45–117)
ALT SERPL-CCNC: 22 U/L (ref 13–56)
ANION GAP SERPL CALC-SCNC: 4 MMOL/L (ref 3–18)
AST SERPL-CCNC: 16 U/L (ref 10–38)
BASOPHILS # BLD: 0 K/UL (ref 0–0.1)
BASOPHILS NFR BLD: 1 % (ref 0–2)
BILIRUB SERPL-MCNC: 0.9 MG/DL (ref 0.2–1)
BUN SERPL-MCNC: 12 MG/DL (ref 7–18)
BUN/CREAT SERPL: 13 (ref 12–20)
CALCIUM SERPL-MCNC: 8.8 MG/DL (ref 8.5–10.1)
CHLORIDE SERPL-SCNC: 107 MMOL/L (ref 100–111)
CHOLEST SERPL-MCNC: 243 MG/DL
CO2 SERPL-SCNC: 31 MMOL/L (ref 21–32)
CREAT SERPL-MCNC: 0.94 MG/DL (ref 0.6–1.3)
DIFFERENTIAL METHOD BLD: NORMAL
EOSINOPHIL # BLD: 0.1 K/UL (ref 0–0.4)
EOSINOPHIL NFR BLD: 1 % (ref 0–5)
ERYTHROCYTE [DISTWIDTH] IN BLOOD BY AUTOMATED COUNT: 14.1 % (ref 11.6–14.5)
GLOBULIN SER CALC-MCNC: 4 G/DL (ref 2–4)
GLUCOSE SERPL-MCNC: 99 MG/DL (ref 74–99)
HCT VFR BLD AUTO: 40.8 % (ref 35–45)
HDLC SERPL-MCNC: 73 MG/DL (ref 40–60)
HDLC SERPL: 3.3 {RATIO} (ref 0–5)
HGB BLD-MCNC: 12.9 G/DL (ref 12–16)
IMM GRANULOCYTES # BLD AUTO: 0 K/UL (ref 0–0.04)
IMM GRANULOCYTES NFR BLD AUTO: 0 % (ref 0–0.5)
LDLC SERPL CALC-MCNC: 155 MG/DL (ref 0–100)
LIPID PROFILE,FLP: ABNORMAL
LYMPHOCYTES # BLD: 1.6 K/UL (ref 0.9–3.6)
LYMPHOCYTES NFR BLD: 32 % (ref 21–52)
MCH RBC QN AUTO: 30.1 PG (ref 24–34)
MCHC RBC AUTO-ENTMCNC: 31.6 G/DL (ref 31–37)
MCV RBC AUTO: 95.1 FL (ref 78–100)
MONOCYTES # BLD: 0.4 K/UL (ref 0.05–1.2)
MONOCYTES NFR BLD: 9 % (ref 3–10)
NEUTS SEG # BLD: 2.9 K/UL (ref 1.8–8)
NEUTS SEG NFR BLD: 57 % (ref 40–73)
NRBC # BLD: 0 K/UL (ref 0–0.01)
NRBC BLD-RTO: 0 PER 100 WBC
PLATELET # BLD AUTO: 256 K/UL (ref 135–420)
PMV BLD AUTO: 11.8 FL (ref 9.2–11.8)
POTASSIUM SERPL-SCNC: 3.7 MMOL/L (ref 3.5–5.5)
PROT SERPL-MCNC: 7.7 G/DL (ref 6.4–8.2)
RBC # BLD AUTO: 4.29 M/UL (ref 4.2–5.3)
SODIUM SERPL-SCNC: 142 MMOL/L (ref 136–145)
TRIGL SERPL-MCNC: 75 MG/DL (ref ?–150)
TSH SERPL DL<=0.05 MIU/L-ACNC: 2.53 UIU/ML (ref 0.36–3.74)
VLDLC SERPL CALC-MCNC: 15 MG/DL
WBC # BLD AUTO: 5.1 K/UL (ref 4.6–13.2)

## 2021-11-15 PROCEDURE — 80061 LIPID PANEL: CPT

## 2021-11-15 PROCEDURE — 85025 COMPLETE CBC W/AUTO DIFF WBC: CPT

## 2021-11-15 PROCEDURE — 36415 COLL VENOUS BLD VENIPUNCTURE: CPT

## 2021-11-15 PROCEDURE — 84443 ASSAY THYROID STIM HORMONE: CPT

## 2021-11-15 PROCEDURE — 80053 COMPREHEN METABOLIC PANEL: CPT

## 2022-01-17 NOTE — PROGRESS NOTES
HISTORY OF PRESENT ILLNESS  Shawn Poole is a 69 y.o. female. She presents for follow-up with a history of hyperlipidemia, recurrent/persistent tinea pedis and toenail onychomycosis as well as a history of obesity and for Medicare wellness evaluation. Mr#: [id number]      Past Medical History:   Diagnosis Date    Arthritis     Chronic pain     Hypercholesterolemia 10/13/2019       Past Surgical History:   Procedure Laterality Date    HX GYN      3 c-sections       Family History   Problem Relation Age of Onset    Glaucoma Mother     Heart defect Mother        No Known Allergies    Social History     Tobacco Use   Smoking Status Former Smoker    Packs/day: 0.15    Years: 3.00    Pack years: 0.45    Types: Cigarettes    Quit date: 2015    Years since quittin.0   Smokeless Tobacco Never Used       Social History     Substance and Sexual Activity   Alcohol Use Not Currently            Patient Active Problem List   Diagnosis Code    Hypercholesterolemia E78.00         Current Outpatient Medications:     multivitamin (ONE A DAY) tablet, Take 1 Tablet by mouth daily. , Disp: , Rfl:         Review of Systems   Constitutional: Negative for chills, fever and weight loss. HENT: Negative for congestion, ear pain, hearing loss and sore throat. Eyes: Negative for blurred vision and double vision. Respiratory: Negative for cough, shortness of breath and wheezing. Cardiovascular: Negative for chest pain, palpitations and leg swelling. Gastrointestinal: Negative for abdominal pain, blood in stool, constipation, diarrhea, heartburn, melena, nausea and vomiting. Genitourinary: Negative for dysuria and urgency. Musculoskeletal: Negative for joint pain and myalgias. Skin: Negative for itching and rash. feet   Neurological: Negative for dizziness, tingling, sensory change, focal weakness and headaches. Endo/Heme/Allergies: Negative for environmental allergies.    Psychiatric/Behavioral: Negative for depression. The patient is not nervous/anxious and does not have insomnia. Visit Vitals  /80   Pulse 66   Temp 97.6 °F (36.4 °C) (Temporal)   Resp 16   Ht 5' 3.78\" (1.62 m)   Wt 201 lb (91.2 kg)   SpO2 99%   BMI 34.74 kg/m²       Physical Exam  Vitals and nursing note reviewed. Constitutional:       General: She is not in acute distress. Appearance: Normal appearance. She is obese. She is not ill-appearing. HENT:      Head: Normocephalic. Right Ear: Tympanic membrane, ear canal and external ear normal.      Left Ear: Tympanic membrane, ear canal and external ear normal.   Eyes:      Extraocular Movements: Extraocular movements intact. Conjunctiva/sclera: Conjunctivae normal.      Pupils: Pupils are equal, round, and reactive to light. Neck:      Vascular: No carotid bruit. Cardiovascular:      Rate and Rhythm: Normal rate and regular rhythm. Heart sounds: Normal heart sounds. Pulmonary:      Effort: Pulmonary effort is normal.      Breath sounds: Normal breath sounds. Abdominal:      Palpations: Abdomen is soft. Tenderness: There is no abdominal tenderness. Musculoskeletal:         General: No deformity. Cervical back: Neck supple. Right lower leg: No edema. Left lower leg: No edema. Skin:     General: Skin is warm and dry. Comments: Mild interdigital maceration consistent with tinea pedis, multiple toenails with appearance of onychomycosis   Neurological:      Mental Status: She is alert and oriented to person, place, and time. Psychiatric:         Mood and Affect: Mood normal.         Behavior: Behavior normal.     Results for Pam Lyn (MRN [id number]) as of 1/17/2022 12:42   Ref.  Range 9/30/2020 10:55 11/15/2021 11:09   WBC Latest Ref Range: 4.6 - 13.2 K/uL 5.1 5.1   NRBC Latest Ref Range: 0  WBC  0.0   RBC Latest Ref Range: 4.20 - 5.30 M/uL 4.19 (L) 4.29   HGB Latest Ref Range: 12.0 - 16.0 g/dL 12.8 12.9   HCT Latest Ref Range: 35.0 - 45.0 % 39.9 40.8   MCV Latest Ref Range: 78.0 - 100.0 FL 95.2 95.1   MCH Latest Ref Range: 24.0 - 34.0 PG 30.5 30.1   MCHC Latest Ref Range: 31.0 - 37.0 g/dL 32.1 31.6   RDW Latest Ref Range: 11.6 - 14.5 % 14.2 14.1   PLATELET Latest Ref Range: 135 - 420 K/uL 263 256     Results for Farheen Gray (MRN [id number]) as of 1/17/2022 12:42   Ref. Range 9/30/2020 10:55 11/15/2021 11:09   Sodium Latest Ref Range: 136 - 145 mmol/L 144 142   Potassium Latest Ref Range: 3.5 - 5.5 mmol/L 3.6 3.7   Chloride Latest Ref Range: 100 - 111 mmol/L 108 107   CO2 Latest Ref Range: 21 - 32 mmol/L 31 31   Anion gap Latest Ref Range: 3.0 - 18 mmol/L 5 4   Glucose Latest Ref Range: 74 - 99 mg/dL 95 99   BUN Latest Ref Range: 7.0 - 18 MG/DL 12 12   Creatinine Latest Ref Range: 0.6 - 1.3 MG/DL 0.94 0.94   BUN/Creatinine ratio Latest Ref Range: 12 - 20   13 13   Calcium Latest Ref Range: 8.5 - 10.1 MG/DL 8.7 8.8   GFR est non-AA Latest Ref Range: >60 ml/min/1.73m2 59 (L) 59 (L)   GFR est AA Latest Ref Range: >60 ml/min/1.73m2 >60 >60   Bilirubin, total Latest Ref Range: 0.2 - 1.0 MG/DL 0.6 0.9   Protein, total Latest Ref Range: 6.4 - 8.2 g/dL 7.3 7.7   Albumin Latest Ref Range: 3.4 - 5.0 g/dL 3.5 3.7   Globulin Latest Ref Range: 2.0 - 4.0 g/dL 3.8 4.0   A-G Ratio Latest Ref Range: 0.8 - 1.7   0.9 0.9   ALT Latest Ref Range: 13 - 56 U/L 22 22   AST Latest Ref Range: 10 - 38 U/L 17 16   Alk. phosphatase Latest Ref Range: 45 - 117 U/L 111 111   Triglyceride Latest Ref Range: <150 MG/ 75   Cholesterol, total Latest Ref Range: <200 MG/ (H) 243 (H)   HDL Cholesterol Latest Ref Range: 40 - 60 MG/DL 75 (H) 73 (H)   CHOL/HDL Ratio Latest Ref Range: 0 - 5.0   3.0 3.3   VLDL, calculated Latest Units: MG/DL 21 15   LDL, calculated Latest Ref Range: 0 - 100 MG/ (H) 155 (H)     ASSESSMENT and PLAN    ICD-10-CM ICD-9-CM    1. Medicare annual wellness visit, subsequent  Z00.00 V70.0    2.  Hypercholesterolemia E78.00 272.0 LIPID PANEL   3. Onychomycosis of toenail  B35.1 110.1 REFERRAL TO PODIATRY   4. Tinea pedis of both feet  B35.3 110.4 REFERRAL TO PODIATRY   5. Obesity (BMI 30.0-34. 9)  E66.9 278.00    6. Needs flu shot  Z23 V04.81 FLU (FLUAD QUAD INFLUENZA VACCINE,QUAD,ADJUVANTED)   7. Asymptomatic postmenopausal estrogen deficiency  Z78.0 V49.81 DEXA BONE DENSITY STUDY AXIAL   Assessment:  Hyperlipidemia with worsened cholesterol levels, ACC/AHA 10-year cardiac-stroke risk 13.8%  Persistent tinea pedis and onychomycosis essentially unchanged  Obesity unimproved    Plan:  Advised of recommendation for treating hyperlipidemia with medication. The patient is extremely reluctant and prefers to try lifestyle modifications for another 3 months prior to considering medication  Avoid dietary starch and sugar and is much as possible follow program of regular aerobic exercise  Return for lipid panel in 3 months  Referral for podiatry evaluation  Avoid dietary starch and sugar and follow program of regular aerobic exercise          Date of Service:  2022   Patient Name:  Daniel Teresa   Patient :  1955     This is a Subsequent Medicare Annual Wellness Visit providing Personalized Prevention Plan Services (PPPS) (Performed 12 months after initial AWV and PPPS )     I have reviewed the patient's medical history in detail and updated the computerized patient record.      History     Past Medical History:   Diagnosis Date    Arthritis     Chronic pain     Hypercholesterolemia 10/13/2019      Past Surgical History:   Procedure Laterality Date    HX GYN      3 c-sections       No Known Allergies  Family History   Problem Relation Age of Onset    Glaucoma Mother     Heart defect Mother      Social History     Tobacco Use    Smoking status: Former Smoker     Packs/day: 0.15     Years: 3.00     Pack years: 0.45     Types: Cigarettes     Quit date: 2015     Years since quittin.0    Smokeless tobacco: Never Used   Substance Use Topics    Alcohol use: Not Currently     Patient Active Problem List   Diagnosis Code    Hypercholesterolemia E78.00       Living situation:   -- Lives  alone  -- Stairs in home yes    Diet, Lifestyle: Former smoker    Exercise level: moderately active    Depression Risk Factor Screening:     3 most recent PHQ Screens 11/12/2021   PHQ Not Done -   Little interest or pleasure in doing things Not at all   Feeling down, depressed, irritable, or hopeless Not at all   Total Score PHQ 2 0     Alcohol Risk Factor Screening:   Do you average more than 1 drink per night or more than 7 drinks a week:  No    On any one occasion in the past three months have you have had more than 3 drinks containing alcohol:  No    Functional Ability and Level of Safety:     Hearing Loss   Hearing is good. Activities of Daily Living   Self-care. Requires assistance with: no ADLs    Fall Risk     Fall Risk Assessment, last 12 mths 11/12/2021   Able to walk? Yes   Fall in past 12 months? 0   Do you feel unsteady? 0   Are you worried about falling 0     Abuse Screen   Patient is not abused    Review of Systems   Review of systems reported in the separate problem-oriented documentation. Physical Examination   Physical exam reported in the separate problem-oriented documentation. Evaluation of Cognitive Function:  Mood/affect:  happy  Appearance: age appropriate  Family member/caregiver input: Daughter    Patient Care Team:  Tyree Smith MD as PCP - General (Family Medicine)  Tyree Smith MD as PCP - [facility] Empaneled Provider    Advice/Counseling   Education and counseling provided:  Advance directives counseling/discussion      Assessment/Plan       ICD-10-CM ICD-9-CM    1. Medicare annual wellness visit, subsequent  Z00.00 V70.0    2. Hypercholesterolemia  E78.00 272.0 LIPID PANEL   3. Onychomycosis of toenail  B35.1 110.1 REFERRAL TO PODIATRY   4.  Tinea pedis of both feet  B35.3 110.4 REFERRAL TO PODIATRY   5. Obesity (BMI 30.0-34. 9)  E66.9 278.00    6. Needs flu shot  Z23 V04.81 FLU (FLUAD QUAD INFLUENZA VACCINE,QUAD,ADJUVANTED)   7. Asymptomatic postmenopausal estrogen deficiency  Z78.0 V49.81 DEXA BONE DENSITY STUDY AXIAL     Health maintenance:        5-year personalized preventative services plan:  Complete and return advance directives form  Consider Shingrix and Tdap immunizations to reduce the risk of shingles and for tetanus prophylaxis, available at the pharmacy  Influenza immunization  Mammogram due December 2022  Bone density scan-ordered  Colorectal cancer screening-please attempt to find out when the last colonoscopy occurred and call us with that information  Medicare wellness evaluation due January 2023    Viky Brambila was provided a written 5-year personalized preventive services plan, which was included in her AVS.    Moshe Melendrez MD      PLEASE NOTE:   This document has been produced using voice recognition software. Unrecognized errors in transcription may be present.

## 2022-01-18 ENCOUNTER — OFFICE VISIT (OUTPATIENT)
Dept: FAMILY MEDICINE CLINIC | Age: 67
End: 2022-01-18
Payer: MEDICARE

## 2022-01-18 VITALS
RESPIRATION RATE: 16 BRPM | TEMPERATURE: 97.6 F | HEART RATE: 66 BPM | OXYGEN SATURATION: 99 % | SYSTOLIC BLOOD PRESSURE: 132 MMHG | HEIGHT: 64 IN | DIASTOLIC BLOOD PRESSURE: 80 MMHG | BODY MASS INDEX: 34.31 KG/M2 | WEIGHT: 201 LBS

## 2022-01-18 DIAGNOSIS — B35.3 TINEA PEDIS OF BOTH FEET: ICD-10-CM

## 2022-01-18 DIAGNOSIS — E66.9 OBESITY (BMI 30.0-34.9): ICD-10-CM

## 2022-01-18 DIAGNOSIS — E78.00 HYPERCHOLESTEROLEMIA: ICD-10-CM

## 2022-01-18 DIAGNOSIS — Z00.00 MEDICARE ANNUAL WELLNESS VISIT, SUBSEQUENT: Primary | ICD-10-CM

## 2022-01-18 DIAGNOSIS — Z23 NEEDS FLU SHOT: ICD-10-CM

## 2022-01-18 DIAGNOSIS — B35.1 ONYCHOMYCOSIS OF TOENAIL: ICD-10-CM

## 2022-01-18 DIAGNOSIS — Z78.0 ASYMPTOMATIC POSTMENOPAUSAL ESTROGEN DEFICIENCY: ICD-10-CM

## 2022-01-18 PROCEDURE — G8536 NO DOC ELDER MAL SCRN: HCPCS | Performed by: FAMILY MEDICINE

## 2022-01-18 PROCEDURE — 99213 OFFICE O/P EST LOW 20 MIN: CPT | Performed by: FAMILY MEDICINE

## 2022-01-18 PROCEDURE — G9899 SCRN MAM PERF RSLTS DOC: HCPCS | Performed by: FAMILY MEDICINE

## 2022-01-18 PROCEDURE — 1101F PT FALLS ASSESS-DOCD LE1/YR: CPT | Performed by: FAMILY MEDICINE

## 2022-01-18 PROCEDURE — 1090F PRES/ABSN URINE INCON ASSESS: CPT | Performed by: FAMILY MEDICINE

## 2022-01-18 PROCEDURE — G8400 PT W/DXA NO RESULTS DOC: HCPCS | Performed by: FAMILY MEDICINE

## 2022-01-18 PROCEDURE — 3017F COLORECTAL CA SCREEN DOC REV: CPT | Performed by: FAMILY MEDICINE

## 2022-01-18 PROCEDURE — G0439 PPPS, SUBSEQ VISIT: HCPCS | Performed by: FAMILY MEDICINE

## 2022-01-18 PROCEDURE — G8417 CALC BMI ABV UP PARAM F/U: HCPCS | Performed by: FAMILY MEDICINE

## 2022-01-18 PROCEDURE — G8510 SCR DEP NEG, NO PLAN REQD: HCPCS | Performed by: FAMILY MEDICINE

## 2022-01-18 PROCEDURE — 90694 VACC AIIV4 NO PRSRV 0.5ML IM: CPT | Performed by: FAMILY MEDICINE

## 2022-01-18 PROCEDURE — G0008 ADMIN INFLUENZA VIRUS VAC: HCPCS | Performed by: FAMILY MEDICINE

## 2022-01-18 PROCEDURE — G8427 DOCREV CUR MEDS BY ELIG CLIN: HCPCS | Performed by: FAMILY MEDICINE

## 2022-01-18 RX ORDER — BISMUTH SUBSALICYLATE 262 MG
1 TABLET,CHEWABLE ORAL DAILY
COMMUNITY

## 2022-01-18 NOTE — PATIENT INSTRUCTIONS
5-year personalized preventative services plan:  Complete and return advance directives form  Consider Shingrix and Tdap immunizations to reduce the risk of shingles and for tetanus prophylaxis, available at the pharmacy  Influenza immunization  Mammogram due December 2022  Bone density scan-ordered  Colorectal cancer screening-please attempt to find out when the last colonoscopy occurred and call us with that information  Medicare wellness evaluation due January 2023    Avoid dietary starch and sugar and is much as possible follow program of regular aerobic exercise  Return for lipid panel in 3 months  Referral for podiatry evaluation  Avoid dietary starch and sugar and follow program of regular aerobic exercise       Learning About Low-Carbohydrate Diets  What is a low-carbohydrate diet? A low-carbohydrate (or \"low-carb\") diet limits foods and drinks that have carbohydrates. This includes grains, fruits, milk and yogurt, and starchy vegetables like potatoes, beans, and corn. It also avoids foods and drinks that have added sugar. Instead, low-carb diets include foods that are high in protein and fat. Why might you follow a low-carb diet? Low-carb diets may be used for a variety of reasons, such as for weight loss. People who have diabetes may use a low-carb diet to help manage their blood sugar levels. What should you do before you start the diet? Talk to your doctor before you try any diet. This is even more important if you have health problems like kidney disease, heart disease, or diabetes. Your doctor may suggest that you meet with a registered dietitian. A dietitian can help you make an eating plan that works for you. What foods do you eat on a low-carb diet? On a low-carb diet, you choose foods that are high in protein and fat. Examples of these are:  · Meat, poultry, and fish. · Eggs. · Nuts, such as walnuts, pecans, almonds, and peanuts.   · Peanut butter and other nut butters. · Tofu. · Avocado. · [place]. · Non-starchy vegetables like broccoli, cauliflower, green beans, mushrooms, peppers, lettuce, and spinach. · Unsweetened non-dairy milks like almond milk and coconut milk. · Cheese, cottage cheese, and cream cheese. Current as of: December 17, 2020               Content Version: 13.0  © 2006-2021 [employer]. Care instructions adapted under license by [employer] (which disclaims liability or warranty for this information). If you have questions about a medical condition or this instruction, always ask your healthcare professional. [place] [address] any warranty or liability for your use of this information. Learning About Low-Carbohydrate Foods  What foods are low in carbohydrate? The foods you eat contain nutrients, such as vitamins and minerals. Carbohydrate is a nutrient. Your body needs the right amount to stay healthy and work as it should. You can use the list below to help you make choices about which foods to eat. Some foods that are lower in carbohydrate include:  Dairy and dairy alternatives  · Cheese  · Cottage cheese  · Cream cheese  · Nut milk (unsweetened)  · Soy milk (unsweetened)  · Yogurt (Greek, plain)  Fruits  · Avocado  · [employer] and other protein foods  · Almonds  · Beef  · Chicken  · Cod  · Eggs  · Halibut  · Peanut butter and other nut butters  · Pistachios  · Pork  · Pumpkin seeds  · Tofu  · Trout  · [facility]  · [place]  · Walnuts  Vegetables  · Broccoli  · Carrots  · Cauliflower  · Green beans  · Mushrooms  · Peppers  · Salad greens  · Spinach  · Tomatoes  Work with your doctor to find out how much of this nutrient you need. Depending on your health, you may need more or less of it in your diet. Where can you learn more? Go to http://www.gray.com/  Enter C470 in the search box to learn more about \"Learning About Low-Carbohydrate Foods. \"  Current as of: December 17, 2020               Content Version: 13.0  © 9607-4610 [employer]. Care instructions adapted under license by [employer] (which disclaims liability or warranty for this information). If you have questions about a medical condition or this instruction, always ask your healthcare professional. [address] any warranty or liability for your use of this information.

## 2022-01-18 NOTE — PROGRESS NOTES
William Guallpa is a 69 y.o. female (: 1955) presenting to address:    Chief Complaint   Patient presents with    Annual Wellness Visit    Immunization/Injection     wants flu shot        Vitals:    22 0913   BP: 132/80   Pulse: 66   Resp: 16   Temp: 97.6 °F (36.4 °C)   TempSrc: Temporal   SpO2: 99%   Weight: 201 lb (91.2 kg)   Height: 5' 3.78\" (1.62 m)   PainSc:   0 - No pain       Hearing/Vision:      Hearing Screening    125Hz 250Hz 500Hz 1000Hz 2000Hz 3000Hz 4000Hz 6000Hz 8000Hz   Right ear:            Left ear:               Visual Acuity Screening    Right eye Left eye Both eyes   Without correction: 20/20 20/25 20/20   With correction:          Learning Assessment:     Learning Assessment 2020   PRIMARY LEARNER Patient   HIGHEST LEVEL OF EDUCATION - PRIMARY LEARNER  4 YEARS OF COLLEGE   BARRIERS PRIMARY LEARNER NONE   CO-LEARNER CAREGIVER No   PRIMARY LANGUAGE ENGLISH    NEED No   LEARNER PREFERENCE PRIMARY READING   ANSWERED BY patient   RELATIONSHIP SELF     Depression Screening:     3 most recent PHQ Screens 2022   PHQ Not Done -   Little interest or pleasure in doing things Not at all   Feeling down, depressed, irritable, or hopeless Not at all   Total Score PHQ 2 0     Fall Risk Assessment:     Fall Risk Assessment, last 12 mths 2022   Able to walk? Yes   Fall in past 12 months? 0   Do you feel unsteady? 0   Are you worried about falling 0     Abuse Screening:     Abuse Screening Questionnaire 2021   Do you ever feel afraid of your partner? N   Are you in a relationship with someone who physically or mentally threatens you? N   Is it safe for you to go home?  Y     ADL Assessment:     ADL Assessment 2022   Feeding yourself No Help Needed   Getting from bed to chair No Help Needed   Getting dressed No Help Needed   Bathing or showering No Help Needed   Walk across the room (includes cane/walker) No Help Needed   Using the telphone No Help Needed   Taking your medications No Help Needed   Preparing meals No Help Needed   Managing money (expenses/bills) No Help Needed   Moderately strenuous housework (laundry) No Help Needed   Shopping for personal items (toiletries/medicines) No Help Needed   Shopping for groceries No Help Needed   Driving No Help Needed   Climbing a flight of stairs No Help Needed   Getting to places beyond walking distances No Help Needed        Coordination of Care Questionaire:   1. \"Have you been to the ER, urgent care clinic since your last visit? Hospitalized since your last visit? \" No    2. \"Have you seen or consulted any other health care providers outside of the [address] [place] [address] since your last visit yes seen podiatry      3. For patients aged 39-70: Has the patient had a colonoscopy? No unsure of date records were requested but didn't receive . If the patient is female:    4. For patients aged 41-77: Has the patient had a mammogram within the past 2 years? Yes - no Care Gap present    5. For patients aged 21-65: Has the patient had a pap smear? NA - based on age    Advanced Directive:   1. Do you have an Advanced Directive? NO    2. Would you like information on Advanced Directives? NO  Flu shot Immunization/s administered 1/18/2022 by Ze Harp LPN with guardian's consent. Patient tolerated procedure well. No reactions noted.

## 2022-03-18 ENCOUNTER — TELEPHONE (OUTPATIENT)
Dept: FAMILY MEDICINE CLINIC | Age: 67
End: 2022-03-18

## 2022-03-18 NOTE — TELEPHONE ENCOUNTER
Please advise Ms. Salguero that the bone density scan done on 3/16/2022 did not show evidence of osteoporosis (severe thinning of the bone).   Routine repeat study would be recommended in 2 years

## 2022-03-31 DIAGNOSIS — Z78.0 ASYMPTOMATIC POSTMENOPAUSAL ESTROGEN DEFICIENCY: ICD-10-CM

## 2022-04-04 LAB — SARS-COV-2, NAA: NOT DETECTED

## 2022-06-01 ENCOUNTER — TELEPHONE (OUTPATIENT)
Dept: FAMILY MEDICINE CLINIC | Age: 67
End: 2022-06-01

## 2022-06-01 NOTE — TELEPHONE ENCOUNTER
Pt is scheduled for labs. Pt is requesting a full panel lab. Please place labs    Future Appointments   Date Time Provider Department Center   6/6/2022 10:40 AM [facility]

## 2022-06-01 NOTE — TELEPHONE ENCOUNTER
Pt is scheduled for labs. Pt is requesting a full panel lab.  Please place labs    Future Appointments   Date Time Provider Dinora Aparicio   6/6/2022 10:40 AM [facility]

## 2022-06-02 DIAGNOSIS — E78.00 HYPERCHOLESTEROLEMIA: Primary | ICD-10-CM

## 2022-06-02 DIAGNOSIS — R94.4 DECREASED GFR: ICD-10-CM

## 2022-06-07 ENCOUNTER — APPOINTMENT (OUTPATIENT)
Dept: FAMILY MEDICINE CLINIC | Age: 67
End: 2022-06-07

## 2022-06-07 ENCOUNTER — HOSPITAL ENCOUNTER (OUTPATIENT)
Dept: LAB | Age: 67
Discharge: HOME | End: 2022-06-07
Payer: MEDICARE

## 2022-06-07 ENCOUNTER — TELEPHONE (OUTPATIENT)
Dept: FAMILY MEDICINE CLINIC | Age: 67
End: 2022-06-07

## 2022-06-07 DIAGNOSIS — E78.00 HYPERCHOLESTEROLEMIA: ICD-10-CM

## 2022-06-07 DIAGNOSIS — R94.4 DECREASED GFR: ICD-10-CM

## 2022-06-07 LAB
ANION GAP SERPL CALC-SCNC: 2 MMOL/L (ref 3–18)
BUN SERPL-MCNC: 12 MG/DL (ref 7–18)
BUN/CREAT SERPL: 15 (ref 12–20)
CALCIUM SERPL-MCNC: 8.7 MG/DL (ref 8.5–10.1)
CHLORIDE SERPL-SCNC: 109 MMOL/L (ref 100–111)
CHOLEST SERPL-MCNC: 194 MG/DL
CO2 SERPL-SCNC: 31 MMOL/L (ref 21–32)
CREAT SERPL-MCNC: 0.82 MG/DL (ref 0.6–1.3)
GLUCOSE SERPL-MCNC: 86 MG/DL (ref 74–99)
HDLC SERPL-MCNC: 61 MG/DL (ref 40–60)
HDLC SERPL: 3.2 {RATIO} (ref 0–5)
LDLC SERPL CALC-MCNC: 120.2 MG/DL (ref 0–100)
LIPID PROFILE,FLP: ABNORMAL
POTASSIUM SERPL-SCNC: 4 MMOL/L (ref 3.5–5.5)
SODIUM SERPL-SCNC: 142 MMOL/L (ref 136–145)
TRIGL SERPL-MCNC: 64 MG/DL (ref ?–150)
VLDLC SERPL CALC-MCNC: 12.8 MG/DL

## 2022-06-07 PROCEDURE — 80061 LIPID PANEL: CPT

## 2022-06-07 PROCEDURE — 36415 COLL VENOUS BLD VENIPUNCTURE: CPT

## 2022-06-07 PROCEDURE — 80048 BASIC METABOLIC PNL TOTAL CA: CPT

## 2022-06-07 NOTE — TELEPHONE ENCOUNTER
Patient daughter . She was not happy with being told that she needs a visit to discuss her symptoms and doesn't care about the cost and wants them ordered . I told her that Dr Sydney Michele would not order any additional labs without an appropriate evaluation . Daughter said well you told me before that he was didn't want to order them because of cost . I told her this is true but also Dr do not order test just because someone is wanting in there has to be good reason . She said then we will make an appointment and he will order vitamin b and d levels . I told her that he has not agreed to ordering anything additional will be discussed at a visit . Daughter said fine we will bring her in and hung up .

## 2022-06-07 NOTE — TELEPHONE ENCOUNTER
I called and spoke with daughter to see why she is requesting a cbc . She told be because the patient is tired and she just wants to make sure all of her levels are good . I told her patient had a cbc in November which was normal . Daughter also requested a vitamin b and d level . I told her that Vitamin d would require a diagnosis for medicare it is not covered as a screening and b12 medicare doesn't cover . Daughter said they would pay for it . I spoke with Dr Matheus Coronado and patient should come in and discuss her symptoms to see what lab tests he feel are appropriate once patient is evaluated .

## 2022-06-07 NOTE — TELEPHONE ENCOUNTER
Patient daughter brought her in for lab appointment today . She is requesting a cbc be ordered . Lab drawn today a lipid and bmp .

## 2022-06-08 NOTE — PROGRESS NOTES
Please advise Ms. Salguero that her lab results show significant improvement in her cholesterol levels however the formula used to calculate 10-year cardiac/stroke risk provided by the [employer] of cardiology/American heart association still shows an estimated 10-year risk of 11.2% with treatment with medication recommended if this percentage is higher than 7.5. It remains up to her whether or not she wishes to try a cholesterol-lowering medication.   Her basic metabolic panel continues to show satisfactory kidney function and electrolytes such as sodium and potassium, her glucose level is normal as well

## 2022-06-09 ENCOUNTER — TELEPHONE (OUTPATIENT)
Dept: FAMILY MEDICINE CLINIC | Age: 67
End: 2022-06-09

## 2022-06-09 NOTE — TELEPHONE ENCOUNTER
6/1/2017 12:16 PM 
 
Ms. Susi Tapia [address] [id number] To Whom it may concern; Susi Tapia is under my care. She was seen in our office today for her routine mammogram and annual exam. 
 
Following this appointment today, she may return to work. Sincerely, Isra Navarro MD 
 
 Patient and daughter notified of message they voiced understanding .